# Patient Record
Sex: FEMALE | Race: WHITE | Employment: PART TIME | URBAN - METROPOLITAN AREA
[De-identification: names, ages, dates, MRNs, and addresses within clinical notes are randomized per-mention and may not be internally consistent; named-entity substitution may affect disease eponyms.]

---

## 2017-02-08 ENCOUNTER — HOSPITAL ENCOUNTER (OUTPATIENT)
Age: 37
Discharge: HOME | End: 2017-02-08
Attending: EMERGENCY MEDICINE
Payer: COMMERCIAL

## 2017-02-08 VITALS
HEIGHT: 64 IN | HEART RATE: 63 BPM | BODY MASS INDEX: 25.61 KG/M2 | SYSTOLIC BLOOD PRESSURE: 143 MMHG | DIASTOLIC BLOOD PRESSURE: 68 MMHG | OXYGEN SATURATION: 100 % | TEMPERATURE: 97 F | WEIGHT: 150 LBS | RESPIRATION RATE: 20 BRPM

## 2017-02-08 DIAGNOSIS — K12.1 STOMATITIS AND MUCOSITIS: Primary | ICD-10-CM

## 2017-02-08 DIAGNOSIS — K12.30 STOMATITIS AND MUCOSITIS: Primary | ICD-10-CM

## 2017-02-08 PROCEDURE — 99212 OFFICE O/P EST SF 10 MIN: CPT

## 2017-02-08 NOTE — ED PROVIDER NOTES
Patient Seen in: [address]    History   Patient presents with:  Sore Throat    Stated Complaint: left side ulcer in mouth, ear pain     HPI    25-year-old female with history of seizure disorder presents complaining of bladder ca; non-smoker   • Breast Cancer Maternal Aunt 46     others dx 60,63   • Breast Cancer Maternal Cousin Female 48     others dx 47, 64         Smoking Status: Never Smoker                      Smokeless Status: Never Used                        Com is in an unusual location and it is slightly larger than normal.  At this point, I am recommending supportive measures such as Motrin or Chloraseptic for supportive relief, but she needs to watch this through to complete resolution due to the unusual jonas

## 2017-02-11 ENCOUNTER — OFFICE VISIT (OUTPATIENT)
Dept: OTOLARYNGOLOGY | Facility: CLINIC | Age: 37
End: 2017-02-11

## 2017-02-11 VITALS
SYSTOLIC BLOOD PRESSURE: 120 MMHG | BODY MASS INDEX: 25.61 KG/M2 | WEIGHT: 150 LBS | TEMPERATURE: 98 F | HEIGHT: 64 IN | DIASTOLIC BLOOD PRESSURE: 78 MMHG

## 2017-02-11 DIAGNOSIS — K12.0 APHTHOUS ULCER: ICD-10-CM

## 2017-02-11 DIAGNOSIS — J32.9 CHRONIC SINUSITIS, UNSPECIFIED LOCATION: Primary | ICD-10-CM

## 2017-02-11 PROCEDURE — 99212 OFFICE O/P EST SF 10 MIN: CPT | Performed by: OTOLARYNGOLOGY

## 2017-02-11 PROCEDURE — 99214 OFFICE O/P EST MOD 30 MIN: CPT | Performed by: OTOLARYNGOLOGY

## 2017-02-11 NOTE — PROGRESS NOTES
Gigi Mukherjee is a 39year old female. Patient presents with:  Throat Problem: Soreness on her throat L side x 1 week. Sinus Problem: Congestion and sinus pressure.       HISTORY OF PRESENT ILLNESS  2/11/2017  She presents with concerns over a lesio Mother    • Breast Cancer Maternal Grandmother 46     d.54   • Stroke Maternal Grandfather    • Heart Attack Maternal Grandfather    • Cancer Maternal Grandfather 46     bladder ca; non-smoker; ; d.90   • Stroke Paternal Grandmother    • Breast Canc Normal. Parotid gland - Normal. Thyroid gland - Normal.   Eyes Normal Conjunctiva - Right: Normal, Left: Normal. Pupil - Right: Normal, Left: Normal. Fundus - Right: Normal, Left: Normal.   Neurological Normal Memory - Normal. Cranial nerves - Cranial nerv obstruction of the sinuses that could be causing all these issues. She will follow-up after this CAT scan to review the films  - CT SINUS (CPT=70486); Future    2.  Aphthous ulcer  I discussed the pathophysiology of benign aphthous ulcers in the fact that

## 2017-02-13 ENCOUNTER — TELEPHONE (OUTPATIENT)
Dept: OTOLARYNGOLOGY | Facility: CLINIC | Age: 37
End: 2017-02-13

## 2017-02-13 NOTE — TELEPHONE ENCOUNTER
Called and spoke with the pt regarding CT scan order, per [address], pt does not need prior authorization with reference number- [id number],pt verbalized understanding and agreed to call the scheduling department.

## 2017-02-14 ENCOUNTER — LAB ENCOUNTER (OUTPATIENT)
Dept: LAB | Age: 37
End: 2017-02-14
Attending: ALLERGY & IMMUNOLOGY
Payer: COMMERCIAL

## 2017-02-14 ENCOUNTER — OFFICE VISIT (OUTPATIENT)
Dept: ALLERGY | Facility: CLINIC | Age: 37
End: 2017-02-14

## 2017-02-14 VITALS
HEART RATE: 64 BPM | TEMPERATURE: 98 F | WEIGHT: 152 LBS | BODY MASS INDEX: 25.95 KG/M2 | DIASTOLIC BLOOD PRESSURE: 82 MMHG | HEIGHT: 64 IN | RESPIRATION RATE: 16 BRPM | SYSTOLIC BLOOD PRESSURE: 118 MMHG

## 2017-02-14 DIAGNOSIS — L29.9 PRURITUS: ICD-10-CM

## 2017-02-14 DIAGNOSIS — J45.20 RAD (REACTIVE AIRWAY DISEASE), MILD INTERMITTENT, UNCOMPLICATED: ICD-10-CM

## 2017-02-14 DIAGNOSIS — L50.5 CHOLINERGIC URTICARIA: ICD-10-CM

## 2017-02-14 DIAGNOSIS — J30.2 SEASONAL ALLERGIC RHINITIS, UNSPECIFIED ALLERGIC RHINITIS TRIGGER: ICD-10-CM

## 2017-02-14 DIAGNOSIS — J30.2 SEASONAL ALLERGIC RHINITIS, UNSPECIFIED ALLERGIC RHINITIS TRIGGER: Primary | ICD-10-CM

## 2017-02-14 LAB
ALBUMIN SERPL BCP-MCNC: 4.2 G/DL (ref 3.5–4.8)
ALBUMIN/GLOB SERPL: 1.5 {RATIO} (ref 1–2)
ALP SERPL-CCNC: 41 U/L (ref 32–100)
ALT SERPL-CCNC: 19 U/L (ref 14–54)
ANION GAP SERPL CALC-SCNC: 9 MMOL/L (ref 0–18)
AST SERPL-CCNC: 23 U/L (ref 15–41)
BASOPHILS # BLD: 0.1 K/UL (ref 0–0.2)
BASOPHILS NFR BLD: 1 %
BILIRUB SERPL-MCNC: 0.7 MG/DL (ref 0.3–1.2)
BUN SERPL-MCNC: 15 MG/DL (ref 8–20)
BUN/CREAT SERPL: 16.1 (ref 10–20)
CALCIUM SERPL-MCNC: 9.2 MG/DL (ref 8.5–10.5)
CHLORIDE SERPL-SCNC: 106 MMOL/L (ref 95–110)
CO2 SERPL-SCNC: 26 MMOL/L (ref 22–32)
CREAT SERPL-MCNC: 0.93 MG/DL (ref 0.5–1.5)
EOSINOPHIL # BLD: 0.1 K/UL (ref 0–0.7)
EOSINOPHIL NFR BLD: 2 %
ERYTHROCYTE [DISTWIDTH] IN BLOOD BY AUTOMATED COUNT: 12.6 % (ref 11–15)
ERYTHROCYTE [SEDIMENTATION RATE] IN BLOOD: 8 MM/HR (ref 0–20)
GLOBULIN PLAS-MCNC: 2.8 G/DL (ref 2.5–3.7)
GLUCOSE SERPL-MCNC: 110 MG/DL (ref 70–99)
HCT VFR BLD AUTO: 42.3 % (ref 35–48)
HGB BLD-MCNC: 14.5 G/DL (ref 12–16)
IGA SERPL-MCNC: 129 MG/DL (ref 68–378)
IGM SERPL-MCNC: 92 MG/DL (ref 60–263)
IMMUNOGLOBULIN PNL SER-MCNC: 953 MG/DL (ref 694–1618)
LYMPHOCYTES # BLD: 1.9 K/UL (ref 1–4)
LYMPHOCYTES NFR BLD: 32 %
MCH RBC QN AUTO: 29 PG (ref 27–32)
MCHC RBC AUTO-ENTMCNC: 34.3 G/DL (ref 32–37)
MCV RBC AUTO: 84.3 FL (ref 80–100)
MONOCYTES # BLD: 0.4 K/UL (ref 0–1)
MONOCYTES NFR BLD: 6 %
NEUTROPHILS # BLD AUTO: 3.6 K/UL (ref 1.8–7.7)
NEUTROPHILS NFR BLD: 59 %
OSMOLALITY UR CALC.SUM OF ELEC: 293 MOSM/KG (ref 275–295)
PLATELET # BLD AUTO: 190 K/UL (ref 140–400)
PMV BLD AUTO: 9.2 FL (ref 7.4–10.3)
POTASSIUM SERPL-SCNC: 4.7 MMOL/L (ref 3.3–5.1)
PROT SERPL-MCNC: 7 G/DL (ref 5.9–8.4)
RBC # BLD AUTO: 5.01 M/UL (ref 3.7–5.4)
SODIUM SERPL-SCNC: 141 MMOL/L (ref 136–144)
TSH SERPL-ACNC: 1.91 UIU/ML (ref 0.34–5.6)
WBC # BLD AUTO: 6.1 K/UL (ref 4–11)

## 2017-02-14 PROCEDURE — 99214 OFFICE O/P EST MOD 30 MIN: CPT | Performed by: ALLERGY & IMMUNOLOGY

## 2017-02-14 PROCEDURE — 82784 ASSAY IGA/IGD/IGG/IGM EACH: CPT

## 2017-02-14 PROCEDURE — 99213 OFFICE O/P EST LOW 20 MIN: CPT | Performed by: ALLERGY & IMMUNOLOGY

## 2017-02-14 PROCEDURE — 84443 ASSAY THYROID STIM HORMONE: CPT

## 2017-02-14 PROCEDURE — 85652 RBC SED RATE AUTOMATED: CPT

## 2017-02-14 PROCEDURE — 80053 COMPREHEN METABOLIC PANEL: CPT

## 2017-02-14 PROCEDURE — 85025 COMPLETE CBC W/AUTO DIFF WBC: CPT

## 2017-02-14 PROCEDURE — 36415 COLL VENOUS BLD VENIPUNCTURE: CPT

## 2017-02-14 PROCEDURE — 94010 BREATHING CAPACITY TEST: CPT | Performed by: ALLERGY & IMMUNOLOGY

## 2017-02-14 NOTE — PROGRESS NOTES
Froy Jacobs is a 39year old female. HPI:   Patient presents with:  Sinus Problem  Hives: f/u    Patient is a 27-year-old female who presents for follow-up with a chief complaint of allergies/sinus issues and hives.     Patient last seen by me on COLPOSCOPY,BX CERVIX/ENDOCERV CURR        Family History   Problem Relation Age of Onset   • Cancer Father 72     prostate ca, prostatectomy   • Hypertension Father    • Allergies Mother    • Breast Cancer Maternal Grandmother 46     d.54   • Stroke Matern Negative for cough, dyspnea and wheezing    PHYSICAL EXAM:   Constitutional: responsive, no acute distress noted  Head/Face: NC/Atraumatic  Eyes/Vision: conjunctiva and lids are normal extraocular motion is intact   Ears/Audiometry: tympanic membranes are results for CT of sinuses  Consider Singulair  Off Nasonex at this time  Xyzal as needed         Orders This Visit:    Orders Placed This Encounter  CBC W Differential  TSH W Reflex To Free T4  Comp Metabolic Panel (14)  Sed Rate, Westergren (Automated)  I

## 2017-02-16 ENCOUNTER — HOSPITAL ENCOUNTER (OUTPATIENT)
Dept: CT IMAGING | Facility: HOSPITAL | Age: 37
Discharge: HOME | End: 2017-02-16
Attending: OTOLARYNGOLOGY
Payer: COMMERCIAL

## 2017-02-16 DIAGNOSIS — J32.9 CHRONIC SINUSITIS, UNSPECIFIED LOCATION: ICD-10-CM

## 2017-02-16 PROCEDURE — 70486 CT MAXILLOFACIAL W/O DYE: CPT

## 2017-02-22 RX ORDER — LEVOCETIRIZINE DIHYDROCHLORIDE 5 MG/1
TABLET, FILM COATED ORAL
Qty: 30 TABLET | Refills: 0 | Status: SHIPPED | OUTPATIENT
Start: 2017-02-22 | End: 2017-03-02

## 2017-02-22 NOTE — TELEPHONE ENCOUNTER
AURORA, notified her of Dr. Sirena Gipson message as written below. Please call office to schedule 15-minute f/u appointment as advised.

## 2017-02-22 NOTE — TELEPHONE ENCOUNTER
Refill requested for:    Levocetirizine Dihydrochloride (XYZAL) 5 MG Oral Tab 90 tablet 1 8/11/2016       Sig :  Take 1 tablet (5 mg total) by mouth nightly.       Route:   Oral         Last office visit:  8/11/16.   Previously advised to follow-up in:  6 m

## 2017-02-22 NOTE — TELEPHONE ENCOUNTER
Call reviewed and noted. Xyzal refilled ×1 month.   Please schedule a follow-up appointment within the next month

## 2017-02-24 ENCOUNTER — TELEPHONE (OUTPATIENT)
Dept: OTOLARYNGOLOGY | Facility: CLINIC | Age: 37
End: 2017-02-24

## 2017-02-24 ENCOUNTER — TELEPHONE (OUTPATIENT)
Dept: ALLERGY | Facility: CLINIC | Age: 37
End: 2017-02-24

## 2017-02-24 NOTE — TELEPHONE ENCOUNTER
Pt wants Dr Елена Damon to be aware had sinus CT done -ordered by Dr Karan Tristan  Results should be available for Dr Елена Damon to view  Made 3/2 f/u with Dr Shey Alejandro allergy closed today & message will not be seen until Monday

## 2017-02-24 NOTE — TELEPHONE ENCOUNTER
Pt informed of results and recommendation; pt verbalized understanding and scheduled f/u appt w/ JMK.

## 2017-02-27 ENCOUNTER — OFFICE VISIT (OUTPATIENT)
Dept: OTOLARYNGOLOGY | Facility: CLINIC | Age: 37
End: 2017-02-27

## 2017-02-27 VITALS
BODY MASS INDEX: 25.61 KG/M2 | TEMPERATURE: 98 F | DIASTOLIC BLOOD PRESSURE: 70 MMHG | SYSTOLIC BLOOD PRESSURE: 90 MMHG | WEIGHT: 150 LBS | HEIGHT: 64 IN

## 2017-02-27 DIAGNOSIS — J32.9 CHRONIC SINUSITIS, UNSPECIFIED LOCATION: Primary | ICD-10-CM

## 2017-02-27 PROCEDURE — 99214 OFFICE O/P EST MOD 30 MIN: CPT | Performed by: OTOLARYNGOLOGY

## 2017-02-27 PROCEDURE — 99212 OFFICE O/P EST SF 10 MIN: CPT | Performed by: OTOLARYNGOLOGY

## 2017-02-27 NOTE — TELEPHONE ENCOUNTER
Dr. Colletta Leach,   LM informing pt that results received and will be reviewed at scheduled appt.  [email or web address]. Please contact office if any additional questions prior to appt. (per results Dr. Alden Trejo RN review results with pt of CT on 2/24/17)

## 2017-02-27 NOTE — PROGRESS NOTES
Zion Mitchell is a 39year old female. Patient presents with: Follow - Up: CT scan result- done on 2/16/17      HISTORY OF PRESENT ILLNESS  2/11/2017  She presents with concerns over a lesion on her palate on the left side.   This started last week Topics            Concern  Caffeine Concern        Yes    Comment:Coffee, soda         Family History   Problem Relation Age of Onset   • Cancer Father 72     prostate ca, prostatectomy   • Hypertension Father    • Allergies Mother    • Breast Cancer Mater Normal.   Constitutional Normal Overall appearance - Normal.   Psychiatric Normal Orientation - Oriented to time, place, person & situation. Appropriate mood and affect.    Neck Exam Normal Inspection - Normal. Palpation - Normal. Parotid gland - Normal. Th bilateral huge inferior turbinates. I discussed the medical management of this including nasal antihistamine sprays nasal steroid sprays antihistamine and decongestant pills and also elimination diets in attempt to see if any there are any food triggers.

## 2017-03-02 ENCOUNTER — OFFICE VISIT (OUTPATIENT)
Dept: ALLERGY | Facility: CLINIC | Age: 37
End: 2017-03-02

## 2017-03-02 VITALS
HEART RATE: 76 BPM | RESPIRATION RATE: 18 BRPM | TEMPERATURE: 98 F | SYSTOLIC BLOOD PRESSURE: 116 MMHG | DIASTOLIC BLOOD PRESSURE: 74 MMHG | WEIGHT: 150 LBS | BODY MASS INDEX: 26 KG/M2

## 2017-03-02 DIAGNOSIS — J30.2 SEASONAL ALLERGIC RHINITIS, UNSPECIFIED ALLERGIC RHINITIS TRIGGER: Primary | ICD-10-CM

## 2017-03-02 DIAGNOSIS — J45.20 RAD (REACTIVE AIRWAY DISEASE), MILD INTERMITTENT, UNCOMPLICATED: ICD-10-CM

## 2017-03-02 DIAGNOSIS — L50.5 CHOLINERGIC URTICARIA: ICD-10-CM

## 2017-03-02 PROCEDURE — 99212 OFFICE O/P EST SF 10 MIN: CPT | Performed by: ALLERGY & IMMUNOLOGY

## 2017-03-02 PROCEDURE — 99214 OFFICE O/P EST MOD 30 MIN: CPT | Performed by: ALLERGY & IMMUNOLOGY

## 2017-03-02 RX ORDER — MONTELUKAST SODIUM 10 MG/1
10 TABLET ORAL NIGHTLY
Qty: 30 TABLET | Refills: 0 | Status: SHIPPED | OUTPATIENT
Start: 2017-03-02 | End: 2017-04-04

## 2017-03-02 RX ORDER — LEVOCETIRIZINE DIHYDROCHLORIDE 5 MG/1
TABLET, FILM COATED ORAL
Qty: 30 TABLET | Refills: 10 | Status: SHIPPED | OUTPATIENT
Start: 2017-03-02 | End: 2019-02-06

## 2017-03-02 NOTE — PROGRESS NOTES
Radha Foreman is a 39year old female. HPI:   No chief complaint on file.     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 This    results in minimal narrowing of the left maxillary infundibulum.  A thin bony septations present in the left maxillary sinus.     ETHMOID SINUSES:    There is no significant muc Comment Vocal cord cyst removal    TONSILLECTOMY  1988    COLPOSCOPY,BX CERVIX/ENDOCERV CURR        Family History   Problem Relation Age of Onset   • Cancer Father 72     prostate ca, prostatectomy   • Hypertension Father    • Allergies Mother    • B Negative for pruritus and rash  Respiratory:  Negative for cough, dyspnea and wheezing    PHYSICAL EXAM:   Constitutional: responsive, no acute distress noted  Head/Face: NC/Atraumatic  Eyes/Vision: conjunctiva and lids are normal extraocular motion is int Referrals:  None     3/2/2017  Genesis Pedraza MD    If medication samples were provided today, they were provided solely for patient education and training related to self administration of these medications.   Teaching, instruction and sample was provid

## 2017-04-04 ENCOUNTER — TELEPHONE (OUTPATIENT)
Dept: ALLERGY | Facility: CLINIC | Age: 37
End: 2017-04-04

## 2017-04-04 RX ORDER — MONTELUKAST SODIUM 10 MG/1
10 TABLET ORAL NIGHTLY
Qty: 30 TABLET | Refills: 10 | Status: SHIPPED | OUTPATIENT
Start: 2017-04-04 | End: 2018-01-31

## 2017-04-04 NOTE — TELEPHONE ENCOUNTER
Lm informing rx approved as requested until f/u appt March 2018 and if any issues prior to f/u please contact office to schedule appt.

## 2017-04-04 NOTE — TELEPHONE ENCOUNTER
Pt calling for refill of singulair, tried requesting from her pharmacy but it wasn't allowed. Please advise      Current Outpatient Prescriptions:  Montelukast Sodium (SINGULAIR) 10 MG Oral Tab Take 1 tablet (10 mg total) by mouth nightly.  Disp: 30 tablet

## 2017-04-04 NOTE — TELEPHONE ENCOUNTER
Call reviewed and noted. Singular refilled ×30 pills with 10 refills.   Patient will need follow-up in March 2018 or sooner if needed

## 2017-04-04 NOTE — TELEPHONE ENCOUNTER
Dr. Brittney Wise,  Pt.  Requesting:  Montelukast Sodium (SINGULAIR) 10 MG Oral Tab 30 tablet 0 3/2/2017       Sig :  Take 1 tablet (10 mg total) by mouth nightly.       Route:   Oral         LOV: 3/2/17  F/U appt: 1 yr  Last filled; 3/2/17 x 1 month        Please

## 2017-04-06 ENCOUNTER — OFFICE VISIT (OUTPATIENT)
Dept: INTERNAL MEDICINE CLINIC | Facility: CLINIC | Age: 37
End: 2017-04-06

## 2017-04-06 VITALS
SYSTOLIC BLOOD PRESSURE: 108 MMHG | BODY MASS INDEX: 26.12 KG/M2 | HEART RATE: 69 BPM | WEIGHT: 153 LBS | DIASTOLIC BLOOD PRESSURE: 77 MMHG | RESPIRATION RATE: 16 BRPM | HEIGHT: 64 IN

## 2017-04-06 DIAGNOSIS — Z00.00 PHYSICAL EXAM, ANNUAL: Primary | ICD-10-CM

## 2017-04-06 PROCEDURE — 99395 PREV VISIT EST AGE 18-39: CPT | Performed by: INTERNAL MEDICINE

## 2017-04-06 NOTE — PROGRESS NOTES
HPI:   Vern Black is a 39year old female who presents for a complete physical exam. Symptoms: periods are regular.  Patient complains of allergies      Immunization History  Administered            Date(s) Administered    Fluvirin, 3 Years & >, Im 4          Past Surgical History    OTHER SURGICAL HISTORY      Comment Vocal cord cyst removal    TONSILLECTOMY  1988    COLPOSCOPY,BX CERVIX/ENDOCERV CURR        Family History   Problem Relation Age of Onset   • Cancer Father 72     prostate ca, prostat lesions  HEENT: atraumatic, normocephalic,ears and throat are clear  EYES:PERRLA, EOMI, normal optic disk,conjunctiva are clear  NECK: supple,no adenopathy,no bruits  CHEST: no chest tenderness\  LUNGS: clear to auscultation  CARDIO: RRR without murmur  GI

## 2017-04-08 ENCOUNTER — LAB ENCOUNTER (OUTPATIENT)
Dept: LAB | Facility: HOSPITAL | Age: 37
End: 2017-04-08
Attending: INTERNAL MEDICINE
Payer: COMMERCIAL

## 2017-04-08 DIAGNOSIS — Z00.00 PHYSICAL EXAM, ANNUAL: ICD-10-CM

## 2017-04-08 PROCEDURE — 80061 LIPID PANEL: CPT

## 2017-04-08 PROCEDURE — 36415 COLL VENOUS BLD VENIPUNCTURE: CPT

## 2017-04-08 PROCEDURE — 82306 VITAMIN D 25 HYDROXY: CPT

## 2017-04-08 PROCEDURE — 85025 COMPLETE CBC W/AUTO DIFF WBC: CPT

## 2017-04-08 PROCEDURE — 81001 URINALYSIS AUTO W/SCOPE: CPT

## 2017-06-14 ENCOUNTER — TELEPHONE (OUTPATIENT)
Dept: SURGERY | Facility: CLINIC | Age: 37
End: 2017-06-14

## 2017-06-15 DIAGNOSIS — R92.2 DENSE BREASTS: ICD-10-CM

## 2017-06-15 DIAGNOSIS — R92.8 ABNORMAL MAMMOGRAM: ICD-10-CM

## 2017-06-15 DIAGNOSIS — Z12.39 BREAST CANCER SCREENING, HIGH RISK PATIENT: Primary | ICD-10-CM

## 2017-06-29 ENCOUNTER — HOSPITAL ENCOUNTER (OUTPATIENT)
Dept: MAMMOGRAPHY | Facility: HOSPITAL | Age: 37
Discharge: HOME | End: 2017-06-29
Attending: SURGERY
Payer: COMMERCIAL

## 2017-06-29 DIAGNOSIS — R92.8 ABNORMAL MAMMOGRAM: ICD-10-CM

## 2017-06-29 DIAGNOSIS — Z12.39 SCREENING FOR BREAST CANCER: ICD-10-CM

## 2017-06-29 DIAGNOSIS — Z12.39 BREAST CANCER SCREENING, HIGH RISK PATIENT: ICD-10-CM

## 2017-06-29 DIAGNOSIS — Z12.39 SCREENING FOR BREAST CANCER: Primary | ICD-10-CM

## 2017-06-29 DIAGNOSIS — R92.2 DENSE BREASTS: ICD-10-CM

## 2017-06-29 PROCEDURE — 77063 BREAST TOMOSYNTHESIS BI: CPT | Performed by: CLINICAL NURSE SPECIALIST

## 2017-06-29 PROCEDURE — 77067 SCR MAMMO BI INCL CAD: CPT | Performed by: CLINICAL NURSE SPECIALIST

## 2017-12-04 RX ORDER — MOMETASONE FUROATE 50 UG/1
SPRAY, METERED NASAL
Qty: 1 BOTTLE | Refills: 2 | Status: SHIPPED | OUTPATIENT
Start: 2017-12-04 | End: 2018-02-08

## 2018-02-01 RX ORDER — MONTELUKAST SODIUM 10 MG/1
TABLET ORAL
Qty: 30 TABLET | Refills: 0 | Status: SHIPPED | OUTPATIENT
Start: 2018-02-01 | End: 2018-02-08

## 2018-02-01 NOTE — TELEPHONE ENCOUNTER
Refill requested for MONTELUKAST 10MG TABLETS  Will file in chart as: MONTELUKAST SODIUM 10 MG Oral Tab  TAKE 1 TABLET(10 MG) BY MOUTH NIGHTLY       Disp: 90 tablet Refills: 0    Class: Normal Start: 1/31/2018   Originally ordered: 11 months ago by Stacy Mcdonough MD  Last refill: 11/3/2017  To pharmacy: **Patient requests 90 days supply**    Last office visit: 3/2/2017     Previously advised to follow up in Follow-up in 1 year or sooner if needed    F/U currently scheduled? no    Date of last refill: 11/3/2017    ACTION: Routed to Dr. Linda Taylor for review.

## 2018-02-08 ENCOUNTER — OFFICE VISIT (OUTPATIENT)
Dept: ALLERGY | Facility: CLINIC | Age: 38
End: 2018-02-08

## 2018-02-08 VITALS
SYSTOLIC BLOOD PRESSURE: 128 MMHG | WEIGHT: 157 LBS | DIASTOLIC BLOOD PRESSURE: 82 MMHG | TEMPERATURE: 99 F | BODY MASS INDEX: 26.8 KG/M2 | HEIGHT: 64 IN | RESPIRATION RATE: 16 BRPM | HEART RATE: 90 BPM | OXYGEN SATURATION: 97 %

## 2018-02-08 DIAGNOSIS — J30.2 SEASONAL ALLERGIC RHINITIS, UNSPECIFIED TRIGGER: ICD-10-CM

## 2018-02-08 DIAGNOSIS — J45.20 RAD (REACTIVE AIRWAY DISEASE), MILD INTERMITTENT, UNCOMPLICATED: Primary | ICD-10-CM

## 2018-02-08 DIAGNOSIS — L50.5 CHOLINERGIC URTICARIA: ICD-10-CM

## 2018-02-08 DIAGNOSIS — L29.9 PRURITUS: ICD-10-CM

## 2018-02-08 PROCEDURE — 94010 BREATHING CAPACITY TEST: CPT | Performed by: ALLERGY & IMMUNOLOGY

## 2018-02-08 PROCEDURE — 99214 OFFICE O/P EST MOD 30 MIN: CPT | Performed by: ALLERGY & IMMUNOLOGY

## 2018-02-08 PROCEDURE — 99212 OFFICE O/P EST SF 10 MIN: CPT | Performed by: ALLERGY & IMMUNOLOGY

## 2018-02-08 RX ORDER — MONTELUKAST SODIUM 10 MG/1
TABLET ORAL
Qty: 90 TABLET | Refills: 2 | Status: SHIPPED | OUTPATIENT
Start: 2018-02-08 | End: 2018-09-01

## 2018-02-08 RX ORDER — MOMETASONE 50 UG/1
SPRAY, METERED NASAL
Qty: 1 BOTTLE | Refills: 5 | Status: SHIPPED | OUTPATIENT
Start: 2018-02-08 | End: 2019-02-06

## 2018-02-08 NOTE — PROGRESS NOTES
Geeta Freeman is a 44year old female. HPI:   Patient presents with: Allergies: Pt presents for 1 yr f/u, last seen for diagnosis of Seasonal allergic rhinitis, unspecified allergic rhinitis trigger, and , pruritus, reactive airway disease.     Pa extracted       Past Surgical History:  No date: COLPOSCOPY,BX CERVIX/ENDOCERV CURR  No date: OTHER SURGICAL HISTORY      Comment: Vocal cord cyst removal  1988: TONSILLECTOMY   Family History   Problem Relation Age of Onset   • Cancer Father 72     prosta sweats,weight loss, irritability and lethargy  ENMT:  Negative for ear drainage, hearing loss and nasal drainage  Eyes:  Negative for eye discharge and vision loss  Gastrointestinal:  Negative for abdominal pain, diarrhea and vomiting  Integumentary:  Rubi Dodge component  Stable and controlled with Xyzal once a night  May increase to twice daily if needed  Add Benadryl as needed    Follow-up in 1 year or sooner if needed         Orders This Visit:  No orders of the defined types were placed in this encounter.

## 2018-06-14 ENCOUNTER — HOSPITAL ENCOUNTER (OUTPATIENT)
Dept: MAMMOGRAPHY | Facility: HOSPITAL | Age: 38
Discharge: HOME | End: 2018-06-14
Attending: SURGERY
Payer: COMMERCIAL

## 2018-06-14 PROCEDURE — 77066 DX MAMMO INCL CAD BI: CPT | Performed by: SURGERY

## 2018-06-14 PROCEDURE — 77062 BREAST TOMOSYNTHESIS BI: CPT | Performed by: SURGERY

## 2018-09-04 RX ORDER — MONTELUKAST SODIUM 10 MG/1
TABLET ORAL
Qty: 90 TABLET | Refills: 0 | Status: SHIPPED | OUTPATIENT
Start: 2018-09-04 | End: 2018-12-02

## 2018-09-04 NOTE — TELEPHONE ENCOUNTER
Pt last seen in Allergy 2/8/2018 for . . .    Rad (reactive airway disease), mild intermittent, uncomplicated  (primary encounter diagnosis)  Seasonal allergic rhinitis, unspecified trigger  Pruritus  Cholinergic urticaria    Refill request received for .

## 2018-12-03 RX ORDER — MONTELUKAST SODIUM 10 MG/1
TABLET ORAL
Qty: 90 TABLET | Refills: 0 | Status: SHIPPED | OUTPATIENT
Start: 2018-12-03 | End: 2019-02-06

## 2018-12-03 NOTE — TELEPHONE ENCOUNTER
Pt last seen in Allergy 2/08/2018 for   .  . .    Rad (reactive airway disease), mild intermittent, uncomplicated  (primary encounter diagnosis)  Seasonal allergic rhinitis, unspecified trigger  Pruritus  Cholinergic urticaria    Refill request received for

## 2018-12-03 NOTE — TELEPHONE ENCOUNTER
Left message for patient informing her that she is due in Feb 2019 to follow up with Dr. Ching Mendoza.

## 2019-02-06 ENCOUNTER — OFFICE VISIT (OUTPATIENT)
Dept: ALLERGY | Facility: CLINIC | Age: 39
End: 2019-02-06
Payer: COMMERCIAL

## 2019-02-06 VITALS
OXYGEN SATURATION: 98 % | SYSTOLIC BLOOD PRESSURE: 96 MMHG | WEIGHT: 155 LBS | DIASTOLIC BLOOD PRESSURE: 60 MMHG | TEMPERATURE: 97 F | BODY MASS INDEX: 26.46 KG/M2 | HEART RATE: 77 BPM | HEIGHT: 64 IN

## 2019-02-06 DIAGNOSIS — J45.20 RAD (REACTIVE AIRWAY DISEASE), MILD INTERMITTENT, UNCOMPLICATED: ICD-10-CM

## 2019-02-06 DIAGNOSIS — L50.5 CHOLINERGIC URTICARIA: ICD-10-CM

## 2019-02-06 DIAGNOSIS — J30.2 SEASONAL ALLERGIC RHINITIS, UNSPECIFIED TRIGGER: Primary | ICD-10-CM

## 2019-02-06 PROCEDURE — 99212 OFFICE O/P EST SF 10 MIN: CPT | Performed by: ALLERGY & IMMUNOLOGY

## 2019-02-06 PROCEDURE — 99214 OFFICE O/P EST MOD 30 MIN: CPT | Performed by: ALLERGY & IMMUNOLOGY

## 2019-02-06 RX ORDER — LEVOCETIRIZINE DIHYDROCHLORIDE 5 MG/1
5 TABLET, FILM COATED ORAL NIGHTLY
Qty: 90 TABLET | Refills: 2 | Status: SHIPPED | OUTPATIENT
Start: 2019-02-06 | End: 2019-11-14

## 2019-02-06 RX ORDER — MOMETASONE 50 UG/1
2 SPRAY, METERED NASAL DAILY
Qty: 3 INHALER | Refills: 2 | Status: SHIPPED | OUTPATIENT
Start: 2019-02-06 | End: 2019-12-26

## 2019-02-06 RX ORDER — MONTELUKAST SODIUM 10 MG/1
10 TABLET ORAL NIGHTLY
Qty: 90 TABLET | Refills: 2 | Status: SHIPPED | OUTPATIENT
Start: 2019-02-06 | End: 2019-03-04

## 2019-02-06 NOTE — PROGRESS NOTES
Yuliet Spine is a 43year old female. HPI:   Patient presents with: Allergies: Patient present for allergic rhinitis    Patient is a 18-year-old female who presents for follow-up with a chief complaint of allergies.     Patient last seen by me in 71        prostate ca, prostatectomy   • Hypertension Father    • Allergies Mother    • Breast Cancer Maternal Grandmother 46        d.54   • Stroke Maternal Grandfather    • Heart Attack Maternal Grandfather    • Cancer Maternal Grandfather 46        blad Negative for eye discharge and vision loss  Gastrointestinal:  Negative for abdominal pain, diarrhea and vomiting  Integumentary:  Negative for pruritus and rash  Respiratory:  Negative for cough, dyspnea and wheezing    PHYSICAL EXAM:   Constitutional: re prescriptions requested or ordered in this encounter       Imaging & Referrals:  None     2/6/2019  Angelo Zuñiga MD    If medication samples were provided today, they were provided solely for patient education and training related to self administrati

## 2019-02-07 ENCOUNTER — TELEPHONE (OUTPATIENT)
Dept: ALLERGY | Facility: CLINIC | Age: 39
End: 2019-02-07

## 2019-02-07 NOTE — TELEPHONE ENCOUNTER
Prior auth needed for med below please contact plan [phone] to initiate pt id# [id number]    Current Outpatient Medications:   •  Mometasone Furoate (NASONEX) 50 MCG/ACT Nasal Suspension, 2 sprays by Nasal route daily. , Disp: 3 Inhaler, Rfl: 2

## 2019-02-07 NOTE — TELEPHONE ENCOUNTER
Please proceed with prior authorization.   In the interim patient may use Flonase/fluticasone or Nasacort over-the-counter

## 2019-02-07 NOTE — TELEPHONE ENCOUNTER
Called patient to notify her that PA will be started for below med. Asked her to return call so we can discuss alternative medication that she can take in the meantime.  Notified her we will be leaving the office shortly, and will not return until Monday mo

## 2019-02-08 NOTE — TELEPHONE ENCOUNTER
Attempted to call [employer] to speak with  in regards to starting a PA. Called [phone] and was on hold for 20 minutes waiting to speak with someone. Please try again later.

## 2019-02-11 NOTE — TELEPHONE ENCOUNTER
Pt returned call, informed that out office is in the process of completing  PA for Nasonex, pt informed once response is received from insurance will inform pt about PA approval and denial and any additional directions pt may have for pt regarding medicati

## 2019-02-12 RX ORDER — FLUTICASONE PROPIONATE 50 MCG
2 SPRAY, SUSPENSION (ML) NASAL DAILY
Qty: 1 BOTTLE | Refills: 5 | Status: CANCELLED | OUTPATIENT
Start: 2019-02-12

## 2019-02-12 NOTE — TELEPHONE ENCOUNTER
[employer] of [address] [place] [address] contacted at [phone], transferred to pharmacy line [phone]. [facility] [address] representative. Informed that Nasonex is not eligible for a PA, med not covered.   Flonase nasal spray 50 mcg/ACT is the only nasal spray covered und

## 2019-02-12 NOTE — TELEPHONE ENCOUNTER
Pt contacted, informed that Nasonex/mometasone is not covered by insurance, insurance was contacted and states that they will not cover Nasonex.   Per pt's Drug formulary and the insurance rep spoken with, pt informed that what is covered under her plan is

## 2019-02-12 NOTE — TELEPHONE ENCOUNTER
Please call patient and informed of the Nasonex/mometasone is not covered by her insurance.   We can either place a prescription for fluticasone/Flonase or she can buy over-the-counter Please forward to Dr Luz Pedroza

## 2019-03-04 RX ORDER — MONTELUKAST SODIUM 10 MG/1
TABLET ORAL
Qty: 90 TABLET | Refills: 1 | Status: SHIPPED | OUTPATIENT
Start: 2019-03-04 | End: 2019-11-15

## 2019-05-09 ENCOUNTER — TELEPHONE (OUTPATIENT)
Dept: ALLERGY | Facility: CLINIC | Age: 39
End: 2019-05-09

## 2019-05-09 RX ORDER — ALBUTEROL SULFATE 90 UG/1
2 AEROSOL, METERED RESPIRATORY (INHALATION) EVERY 6 HOURS PRN
Qty: 1 INHALER | Refills: 0 | Status: SHIPPED | OUTPATIENT
Start: 2019-05-09 | End: 2019-07-19

## 2019-05-09 NOTE — TELEPHONE ENCOUNTER
Pt states that she has a cough that started about a week ago. Per pt Dr. Aric Huntley told her to call the office when she gets a cough so that he can prescribe an inhaler to her.  Pt is aware the office is closed on Friday and this will be addressed when the [facility]

## 2019-05-09 NOTE — TELEPHONE ENCOUNTER
Pt contacted, informed that Rx for Albuterol was sent to her [facility] in [place] on [address]. Pt informed  advises that pt take 2 puffs of albuterol every 4-6 hours while RAD flaring.  Take Albuterol 2 puffs 15 min prior to exercise

## 2019-05-09 NOTE — TELEPHONE ENCOUNTER
Call reviewed and noted. Prescription for albuterol 2 puffs every 4-6 hours and 15 minutes prior to exercise sent to patient's pharmacy. If worsening symptoms after hours and recommend urgent care ED evaluation.

## 2019-05-09 NOTE — TELEPHONE ENCOUNTER
Left message for patient to call back regarding cough and asthma symptoms. In voicemail message, notified her that a nurse will be in the office until approximately 5:00PM tonight and will return for the phones from 8:00-12:00.  If she has any difficulty br

## 2019-05-09 NOTE — TELEPHONE ENCOUNTER
Assessment    Pt last seen in Allergy 2/6/2019  for AR, RAD, urticaria 2/6/2019. Pt reports that she developed an upper respiratory infection 5/1/2019 that has spread to lungs. Pt c/o nasal congestion that is improving.  Pt has a productive cough w

## 2019-05-28 ENCOUNTER — TELEPHONE (OUTPATIENT)
Dept: SURGERY | Facility: CLINIC | Age: 39
End: 2019-05-28

## 2019-05-28 NOTE — TELEPHONE ENCOUNTER
Spoke with patient to clarify the mammogram order.  I let her know I reviewed Dr Teresa Estevez last note to her, \"As long as you are having no new clinical symptoms you will be able to await any further screening for 1 year.  \"  I let her know there is an orde

## 2019-05-28 NOTE — TELEPHONE ENCOUNTER
[facility] for patient returning her call. She was asking about having Dr Joel Madsen order, and or review her results from her mammogram due in June. I let her know the correct mammogram is in the system, but Dr Joel Madsen will not get the results directly.  The order

## 2019-06-24 ENCOUNTER — HOSPITAL ENCOUNTER (OUTPATIENT)
Dept: MAMMOGRAPHY | Facility: HOSPITAL | Age: 39
Discharge: HOME | End: 2019-06-24
Attending: OBSTETRICS & GYNECOLOGY
Payer: COMMERCIAL

## 2019-06-24 DIAGNOSIS — Z12.31 ENCOUNTER FOR SCREENING MAMMOGRAM FOR MALIGNANT NEOPLASM OF BREAST: ICD-10-CM

## 2019-06-24 PROCEDURE — 77063 BREAST TOMOSYNTHESIS BI: CPT | Performed by: OBSTETRICS & GYNECOLOGY

## 2019-06-24 PROCEDURE — 77067 SCR MAMMO BI INCL CAD: CPT | Performed by: OBSTETRICS & GYNECOLOGY

## 2019-07-19 ENCOUNTER — OFFICE VISIT (OUTPATIENT)
Dept: INTERNAL MEDICINE CLINIC | Facility: CLINIC | Age: 39
End: 2019-07-19
Payer: COMMERCIAL

## 2019-07-19 VITALS
BODY MASS INDEX: 26.46 KG/M2 | HEIGHT: 64 IN | HEART RATE: 81 BPM | TEMPERATURE: 98 F | WEIGHT: 155 LBS | SYSTOLIC BLOOD PRESSURE: 125 MMHG | DIASTOLIC BLOOD PRESSURE: 86 MMHG

## 2019-07-19 DIAGNOSIS — R10.84 GENERALIZED ABDOMINAL DISCOMFORT: Primary | ICD-10-CM

## 2019-07-19 PROCEDURE — 99213 OFFICE O/P EST LOW 20 MIN: CPT | Performed by: PHYSICIAN ASSISTANT

## 2019-07-19 RX ORDER — OMEPRAZOLE 20 MG/1
20 CAPSULE, DELAYED RELEASE ORAL
Qty: 30 CAPSULE | Refills: 1 | Status: SHIPPED | OUTPATIENT
Start: 2019-07-19 | End: 2021-03-01

## 2019-07-19 RX ORDER — OMEPRAZOLE 20 MG/1
20 CAPSULE, DELAYED RELEASE ORAL
Qty: 30 CAPSULE | Refills: 1 | Status: SHIPPED | OUTPATIENT
Start: 2019-07-19 | End: 2019-07-19

## 2019-07-19 NOTE — PROGRESS NOTES
HPI:    Patient ID: Jackson Liao is a 35year old female. HPI  Per patient has been having upset stomach, symptoms vary from diarrhea, constipation, \"gassy\", and bloating x3 weeks has tried OTC meds with no some relief.   Has not had this in the suppressive treatment   • Vocal cord cyst    • [place] teeth extracted      Social History    Tobacco Use      Smoking status: Never Smoker      Smokeless tobacco: Never Used      Tobacco comment: No household smokers.      Alcohol use: Yes      Comment: soc Pulmonary/Chest: Effort normal and breath sounds normal. No respiratory distress. She has no wheezes. Abdominal: Soft. She exhibits no distension. There is no tenderness. There is no rebound and no guarding.    Lymphadenopathy:     She has no cervical a

## 2019-07-21 ENCOUNTER — HOSPITAL ENCOUNTER (OUTPATIENT)
Age: 39
Discharge: HOME | End: 2019-07-21
Attending: EMERGENCY MEDICINE
Payer: COMMERCIAL

## 2019-07-21 ENCOUNTER — APPOINTMENT (OUTPATIENT)
Dept: GENERAL RADIOLOGY | Age: 39
End: 2019-07-21
Attending: EMERGENCY MEDICINE
Payer: COMMERCIAL

## 2019-07-21 VITALS
RESPIRATION RATE: 18 BRPM | DIASTOLIC BLOOD PRESSURE: 73 MMHG | SYSTOLIC BLOOD PRESSURE: 107 MMHG | TEMPERATURE: 98 F | BODY MASS INDEX: 26.46 KG/M2 | HEIGHT: 64 IN | WEIGHT: 155 LBS | HEART RATE: 72 BPM | OXYGEN SATURATION: 100 %

## 2019-07-21 DIAGNOSIS — S92.424A CLOSED NONDISPLACED FRACTURE OF DISTAL PHALANX OF RIGHT GREAT TOE, INITIAL ENCOUNTER: Primary | ICD-10-CM

## 2019-07-21 PROCEDURE — 28510 TREATMENT OF TOE FRACTURE: CPT

## 2019-07-21 PROCEDURE — 99212 OFFICE O/P EST SF 10 MIN: CPT

## 2019-07-21 PROCEDURE — 99213 OFFICE O/P EST LOW 20 MIN: CPT

## 2019-07-21 PROCEDURE — 73660 X-RAY EXAM OF TOE(S): CPT | Performed by: EMERGENCY MEDICINE

## 2019-07-21 NOTE — ED PROVIDER NOTES
Patient presents with:  Lower Extremity Injury (musculoskeletal)      HPI:     Radha Foreman is a 35year old female who presents today with a chief complaint of pain in the right great toe after crush injury by a piano bench falling directly on top 7/21/2019  CONCLUSION:  1. Transversely oriented fracture of the right distal phalangeal tuft of the hallux. 2. Mild hallux valgus deformity.     Dictated by (CST): Dillon Hernandez MD on 7/21/2019 at 12:30     Approved by (CST): Dillon Hernandez MD on 7/21/

## 2019-11-15 RX ORDER — MONTELUKAST SODIUM 10 MG/1
TABLET ORAL
Qty: 90 TABLET | Refills: 0 | Status: SHIPPED | OUTPATIENT
Start: 2019-11-15 | End: 2019-12-26

## 2019-11-15 RX ORDER — LEVOCETIRIZINE DIHYDROCHLORIDE 5 MG/1
TABLET, FILM COATED ORAL
Qty: 90 TABLET | Refills: 0 | Status: SHIPPED | OUTPATIENT
Start: 2019-11-15 | End: 2019-12-26

## 2019-11-15 NOTE — TELEPHONE ENCOUNTER
Refill requested for:  Name from pharmacy: LEVOCETIRIZINE 5MG TABLETS          Will file in chart as: LEVOCETIRIZINE DIHYDROCHLORIDE 5 MG Oral Tab         Sig: TAKE 1 TABLET(5 MG) BY MOUTH EVERY NIGHT    Disp:  90 tablet    Refills:  0    Start: 11/14/2019

## 2019-11-15 NOTE — TELEPHONE ENCOUNTER
Refill requested for:  Name from pharmacy: MONTELUKAST 10MG TABLETS          Will file in chart as: MONTELUKAST SODIUM 10 MG Oral Tab    The source prescription was discontinued on 3/4/2019 by Froy Kelly MD.         Sig: TAKE 1 TABLET(10 MG) BY MATILDA

## 2019-11-15 NOTE — TELEPHONE ENCOUNTER
Xyzal refilled x90 days. Patient will need yearly follow-up by February 2020.   Please call to schedule

## 2019-11-15 NOTE — TELEPHONE ENCOUNTER
Singular refilled x90 days. Patient will be due for one-year follow-up in February 2020.   Please call to schedule

## 2019-11-15 NOTE — TELEPHONE ENCOUNTER
Called patient to notify her of RX refill and assisted in scheduling yearly follow up for Februrary 2020.

## 2019-12-13 ENCOUNTER — OFFICE VISIT (OUTPATIENT)
Dept: INTERNAL MEDICINE CLINIC | Facility: CLINIC | Age: 39
End: 2019-12-13
Payer: COMMERCIAL

## 2019-12-13 ENCOUNTER — TELEPHONE (OUTPATIENT)
Dept: ALLERGY | Facility: CLINIC | Age: 39
End: 2019-12-13

## 2019-12-13 VITALS
BODY MASS INDEX: 27.25 KG/M2 | HEART RATE: 98 BPM | SYSTOLIC BLOOD PRESSURE: 116 MMHG | TEMPERATURE: 98 F | DIASTOLIC BLOOD PRESSURE: 87 MMHG | WEIGHT: 159.63 LBS | HEIGHT: 64 IN | OXYGEN SATURATION: 98 %

## 2019-12-13 DIAGNOSIS — R05.9 COUGH: Primary | ICD-10-CM

## 2019-12-13 PROCEDURE — 99213 OFFICE O/P EST LOW 20 MIN: CPT | Performed by: PHYSICIAN ASSISTANT

## 2019-12-13 NOTE — TELEPHONE ENCOUNTER
Call reviewed and noted. Agree with triage advice provided. Agree with patient being seen at urgent care or with PCP today  if having difficulty breathing.   Patient has follow-up appointment with me on December 26

## 2019-12-13 NOTE — PROGRESS NOTES
HPI:    Patient ID: Scott Littlejohn is a 35year old female. HPI   Patient presents with a cough for the past three weeks.  She notes before thanksgiving it started as a chest cold with mucus prodcution that subsided and now she has a intermittent co Medical History:   Diagnosis Date   • Dysplasia of cervix    • Mononucleosis    • SEASONAL ALLERGIES    • Seizure disorder ([facility] [address]     infantile treated to age 2   • Thyroid nodule     teenager, suppressive treatment   • Vocal cord cyst    • [place] teeth ext Conjunctivae and EOM are normal.   Neck: Normal range of motion. Neck supple. No thyromegaly present. Cardiovascular: Normal rate, regular rhythm and normal heart sounds. Pulmonary/Chest: Effort normal. No respiratory distress. She has wheezes.    Dorean Race

## 2019-12-13 NOTE — TELEPHONE ENCOUNTER
RN called patient with Dr. Navas Bring recommendations. She states she just saw her PCP and they didn't find any infection. She reports they have started her on a new inhaler. Per Epic, it appears that they have ordered Fluticasone-Salmeterol.    See encount

## 2019-12-13 NOTE — TELEPHONE ENCOUNTER
Wheezing Cough Triage:    Patient had suspected sinus infection with post nasal drip for a few weeks. Has settled into chest with cough with wheezing. Has albuterol inhaler and using periodically. Last used it two nights ago with some relief.      Leeann Monday but will route encounter to him and call if he has additional recommendations. Patient verbalized understanding and has no further questions. Routed to Dr. Елена Damon.

## 2019-12-26 ENCOUNTER — OFFICE VISIT (OUTPATIENT)
Dept: ALLERGY | Facility: CLINIC | Age: 39
End: 2019-12-26
Payer: COMMERCIAL

## 2019-12-26 VITALS
OXYGEN SATURATION: 98 % | HEART RATE: 90 BPM | RESPIRATION RATE: 18 BRPM | SYSTOLIC BLOOD PRESSURE: 116 MMHG | DIASTOLIC BLOOD PRESSURE: 77 MMHG | TEMPERATURE: 98 F

## 2019-12-26 DIAGNOSIS — J30.2 SEASONAL ALLERGIC RHINITIS, UNSPECIFIED TRIGGER: Primary | ICD-10-CM

## 2019-12-26 DIAGNOSIS — L50.5 CHOLINERGIC URTICARIA: ICD-10-CM

## 2019-12-26 DIAGNOSIS — J45.20 RAD (REACTIVE AIRWAY DISEASE), MILD INTERMITTENT, UNCOMPLICATED: ICD-10-CM

## 2019-12-26 PROCEDURE — 94010 BREATHING CAPACITY TEST: CPT | Performed by: ALLERGY & IMMUNOLOGY

## 2019-12-26 PROCEDURE — 99214 OFFICE O/P EST MOD 30 MIN: CPT | Performed by: ALLERGY & IMMUNOLOGY

## 2019-12-26 RX ORDER — MOMETASONE 50 UG/1
2 SPRAY, METERED NASAL DAILY
Qty: 3 INHALER | Refills: 2 | Status: SHIPPED | OUTPATIENT
Start: 2019-12-26

## 2019-12-26 RX ORDER — MONTELUKAST SODIUM 10 MG/1
TABLET ORAL
Qty: 90 TABLET | Refills: 1 | Status: SHIPPED | OUTPATIENT
Start: 2019-12-26 | End: 2020-08-15

## 2019-12-26 RX ORDER — LEVOCETIRIZINE DIHYDROCHLORIDE 5 MG/1
TABLET, FILM COATED ORAL
Qty: 90 TABLET | Refills: 1 | Status: SHIPPED | OUTPATIENT
Start: 2019-12-26 | End: 2020-08-15

## 2019-12-26 NOTE — PATIENT INSTRUCTIONS
1. AR  Continue with current Xyzal Singulair Nasonex  Consider sinus rinses if refractory      2. Asthma/rad  Recent flare over the past month which appear to be triggered by a viral infection. Patient 85% improved at this time.   Patient has Advair at [facility]

## 2019-12-26 NOTE — PROGRESS NOTES
Den Banerjee is a 35year old female. HPI:   No chief complaint on file.     Patient is a 71-year-old female who presents for follow-up    Patient last seen by me in February 2019    Patient has a history of rhinitis that can worsen the spring and Cancer Father 72        prostate ca, prostatectomy   • Hypertension Father    • Allergies Mother    • Breast Cancer Maternal Grandmother 46        d.54   • Stroke Maternal Grandfather    • Heart Attack Maternal Grandfather    • Cancer Maternal Grandfather Negative for irregular heartbeat/palpitations, chest pain, edema  Constitutional:  Negative night sweats,weight loss, irritability and lethargy  ENMT:  Negative for ear drainage, hearing loss and nasal drainage  Eyes:  Negative for eye discharge and visio symptoms more than 2 days/week  Flu vaccine up-to-date      3. Cholinergic urticaria  Continue with Xyzal 5 mg once night at bedtime.   May titrate up to 2-4 times per day if needed  Avoid heat if possible      Follow-up in 6 to 12 months or sooner if need

## 2020-07-02 ENCOUNTER — PATIENT MESSAGE (OUTPATIENT)
Dept: INTERNAL MEDICINE CLINIC | Facility: CLINIC | Age: 40
End: 2020-07-02

## 2020-07-02 ENCOUNTER — NURSE TRIAGE (OUTPATIENT)
Dept: INTERNAL MEDICINE CLINIC | Facility: CLINIC | Age: 40
End: 2020-07-02

## 2020-07-02 NOTE — TELEPHONE ENCOUNTER
Please call patient and triage regarding MyChart message copied here. [employer] message response sent in original [employer] encounter, per department process. ----- Message from Mary Anderson sent at 7/2/2020  9:19 AM CDT -----  Regarding: Non-Urgent M

## 2020-07-02 NOTE — TELEPHONE ENCOUNTER
From: Onelia Brody  To: Elis Costa PA-C  Sent: 7/2/2020 9:19 AM CDT  Subject: Non-Urgent Medical Question    Hello,    For a few weeks now, I have had tiny goosebump-like bumps on my forearms (and only there- nowhere else) that are very itchy.  The

## 2020-07-02 NOTE — TELEPHONE ENCOUNTER
Action Requested: Summary for Provider     []  Critical Lab, Recommendations Needed  [] Need Additional Advice  []   FYI    []   Need Orders  [] Need Medications Sent to Pharmacy  []  Other     SUMMARY: Pt was called and she stated that for the past 2 week

## 2020-07-03 ENCOUNTER — TELEMEDICINE (OUTPATIENT)
Dept: INTERNAL MEDICINE CLINIC | Facility: CLINIC | Age: 40
End: 2020-07-03
Payer: COMMERCIAL

## 2020-07-03 DIAGNOSIS — T78.40XA RASH DUE TO ALLERGY: Primary | ICD-10-CM

## 2020-07-03 DIAGNOSIS — R21 RASH DUE TO ALLERGY: Primary | ICD-10-CM

## 2020-07-03 PROCEDURE — 99213 OFFICE O/P EST LOW 20 MIN: CPT | Performed by: PHYSICIAN ASSISTANT

## 2020-07-03 RX ORDER — METHYLPREDNISOLONE 4 MG/1
TABLET ORAL
Qty: 1 KIT | Refills: 0 | Status: SHIPPED | OUTPATIENT
Start: 2020-07-03 | End: 2021-03-01

## 2020-07-03 NOTE — PROGRESS NOTES
This is a telemedicine visit with live, interactive video and audio. Patient understands and accepts financial responsibility for any deductible, co-insurance and/or co-pays associated with this service.     SUBJECTIVE  Patient presents with itchy rash methylPREDNISolone (MEDROL) 4 MG Oral Tablet Therapy Pack As directed. 1 kit 0   • Mometasone Furoate (NASONEX) 50 MCG/ACT Nasal Suspension 2 sprays by Nasal route daily.  3 Inhaler 2   • Levocetirizine Dihydrochloride 5 MG Oral Tab TAKE 1 TABLET(5 MG) BY M

## 2020-08-15 RX ORDER — MONTELUKAST SODIUM 10 MG/1
TABLET ORAL
Qty: 30 TABLET | Refills: 0 | Status: SHIPPED | OUTPATIENT
Start: 2020-08-15 | End: 2021-03-01

## 2020-08-15 RX ORDER — LEVOCETIRIZINE DIHYDROCHLORIDE 5 MG/1
TABLET, FILM COATED ORAL
Qty: 30 TABLET | Refills: 0 | Status: SHIPPED | OUTPATIENT
Start: 2020-08-15 | End: 2021-03-01

## 2020-08-15 NOTE — TELEPHONE ENCOUNTER
Refill requested for:  Name from pharmacy: MONTELUKAST 10MG TABLETS          Will file in chart as: MONTELUKAST SODIUM 10 MG Oral Tab         Sig: TAKE 1 TABLET BY MOUTH EVERY NIGHT    Disp:  90 tablet    Refills:  1 (Pharmacy requested: Not specified)

## 2020-08-15 NOTE — TELEPHONE ENCOUNTER
Refill requested for    Name from pharmacy: LEVOCETIRIZINE 5MG TABLETS         Will file in chart as: LEVOCETIRIZINE DIHYDROCHLORIDE 5 MG Oral Tab         Sig: TAKE 1 TABLET BY MOUTH EVERY NIGHT    Disp:  90 tablet    Refills:  1 (Pharmacy requested: Not s

## 2020-08-15 NOTE — TELEPHONE ENCOUNTER
RN left voicemail to notify patient of RX refill and need for follow up. Provided call back number to schedule.

## 2020-08-19 ENCOUNTER — HOSPITAL ENCOUNTER (OUTPATIENT)
Dept: MAMMOGRAPHY | Facility: HOSPITAL | Age: 40
Discharge: HOME | End: 2020-08-19
Attending: OBSTETRICS & GYNECOLOGY
Payer: COMMERCIAL

## 2020-08-19 DIAGNOSIS — Z12.31 ENCOUNTER FOR SCREENING MAMMOGRAM FOR MALIGNANT NEOPLASM OF BREAST: ICD-10-CM

## 2020-08-19 PROCEDURE — 77063 BREAST TOMOSYNTHESIS BI: CPT | Performed by: OBSTETRICS & GYNECOLOGY

## 2020-08-19 PROCEDURE — 77067 SCR MAMMO BI INCL CAD: CPT | Performed by: OBSTETRICS & GYNECOLOGY

## 2020-08-24 ENCOUNTER — TELEMEDICINE (OUTPATIENT)
Dept: INTERNAL MEDICINE CLINIC | Facility: CLINIC | Age: 40
End: 2020-08-24

## 2020-08-24 DIAGNOSIS — L30.9 DERMATITIS: Primary | ICD-10-CM

## 2020-08-24 PROCEDURE — 99213 OFFICE O/P EST LOW 20 MIN: CPT | Performed by: PHYSICIAN ASSISTANT

## 2020-08-24 RX ORDER — CLOTRIMAZOLE AND BETAMETHASONE DIPROPIONATE 10; .64 MG/G; MG/G
CREAM TOPICAL
Qty: 45 G | Refills: 0 | Status: SHIPPED | OUTPATIENT
Start: 2020-08-24 | End: 2021-03-01

## 2020-08-24 NOTE — PROGRESS NOTES
This is a telemedicine visit with live, interactive video and audio. Patient understands and accepts financial responsibility for any deductible, co-insurance and/or co-pays associated with this service.     SUBJECTIVE  Pt presents with follow up of dari Medications   Medication Sig Dispense Refill   • triamcinolone acetonide 0.1 % External Cream Apply topically 2 (two) times daily as needed.  60 g 0   • clotrimazole-betamethasone 1-0.05 % External Cream Apply a small amount to the effected area daily for o Apply a small amount to the effected area daily for one week.  Do not apply inside the vagina          Andreina Rabago PA-C

## 2020-09-09 RX ORDER — LEVOCETIRIZINE DIHYDROCHLORIDE 5 MG/1
TABLET, FILM COATED ORAL
Qty: 30 TABLET | Refills: 0 | OUTPATIENT
Start: 2020-09-09

## 2020-09-09 RX ORDER — MONTELUKAST SODIUM 10 MG/1
TABLET ORAL
Qty: 30 TABLET | Refills: 0 | OUTPATIENT
Start: 2020-09-09

## 2020-09-09 NOTE — TELEPHONE ENCOUNTER
Refill requested for   Montelukast Sodium 10 MG Oral Tab 30 tablet 0 8/15/2020    Sig: Lee Ann Distance 1 TABLET BY MOUTH EVERY NIGHT         Last office visit: 12/26/19    Previously advised to follow up in:  6-12 months    F/U currently scheduled?  No      Appointm

## 2020-09-09 NOTE — TELEPHONE ENCOUNTER
Spoke with patient, assisted to schedule video visit for 9/23/20 at 9:30am.  She reports asthma has been well-controlled. Some recent skin \"issues\" she has seen Dermatology for. She would like to discuss if any medication changes are warranted.

## 2020-09-09 NOTE — TELEPHONE ENCOUNTER
Refill request tinnitus patient is overdue for follow-up appointment. Refill was granted in mid August advised to follow-up within the month.   Patient will need follow-up appointment for further refills

## 2020-09-09 NOTE — TELEPHONE ENCOUNTER
Given her history of asthma and being on Singulair I would prefer follow-up by 6 months. Patient last seen in December 2019.

## 2020-09-23 ENCOUNTER — TELEMEDICINE (OUTPATIENT)
Dept: ALLERGY | Facility: CLINIC | Age: 40
End: 2020-09-23

## 2020-09-23 DIAGNOSIS — J30.2 SEASONAL ALLERGIC RHINITIS, UNSPECIFIED TRIGGER: Primary | ICD-10-CM

## 2020-09-23 DIAGNOSIS — J45.20 RAD (REACTIVE AIRWAY DISEASE), MILD INTERMITTENT, UNCOMPLICATED: ICD-10-CM

## 2020-09-23 DIAGNOSIS — L50.5 CHOLINERGIC URTICARIA: ICD-10-CM

## 2020-09-23 PROCEDURE — 99214 OFFICE O/P EST MOD 30 MIN: CPT | Performed by: ALLERGY & IMMUNOLOGY

## 2020-09-23 RX ORDER — LEVOCETIRIZINE DIHYDROCHLORIDE 5 MG/1
5 TABLET, FILM COATED ORAL NIGHTLY
Qty: 90 TABLET | Refills: 2 | Status: CANCELLED | OUTPATIENT
Start: 2020-09-23

## 2020-09-23 RX ORDER — LEVOCETIRIZINE DIHYDROCHLORIDE 5 MG/1
5 TABLET, FILM COATED ORAL NIGHTLY
Qty: 90 TABLET | Refills: 2 | Status: SHIPPED | OUTPATIENT
Start: 2020-09-23 | End: 2021-06-17

## 2020-09-23 RX ORDER — LEVOCETIRIZINE DIHYDROCHLORIDE 5 MG/1
5 TABLET, FILM COATED ORAL NIGHTLY
Qty: 90 TABLET | Refills: 1 | Status: CANCELLED | OUTPATIENT
Start: 2020-09-23

## 2020-09-23 RX ORDER — MONTELUKAST SODIUM 10 MG/1
10 TABLET ORAL NIGHTLY
Qty: 90 TABLET | Refills: 2 | Status: CANCELLED | OUTPATIENT
Start: 2020-09-23

## 2020-09-23 RX ORDER — MONTELUKAST SODIUM 10 MG/1
10 TABLET ORAL NIGHTLY
Qty: 90 TABLET | Refills: 1 | Status: CANCELLED | OUTPATIENT
Start: 2020-09-23

## 2020-09-23 RX ORDER — MONTELUKAST SODIUM 10 MG/1
10 TABLET ORAL NIGHTLY
Qty: 90 TABLET | Refills: 2 | Status: SHIPPED | OUTPATIENT
Start: 2020-09-23 | End: 2021-06-17

## 2020-09-23 NOTE — PROGRESS NOTES
Andreina Milligan is a 38year old female. HPI:   No chief complaint on file. This visit is conducted using Telemedicine with live, interactive video and audio during this Coronavirus pandemic.      Please note that the following visit was completed u teenager, suppressive treatment   • Vocal cord cyst    • [place] teeth extracted       Past Surgical History:   Procedure Laterality Date   • CARPAL TUNNEL RELEASE     • COLPOSCOPY,BX CERVIX/ENDOCERV CURR     • OTHER SURGICAL HISTORY      Vocal cord cyst re Aerosol Inhale 2 puffs into the lungs 2 (two) times daily.  (Patient not taking: Reported on 12/26/2019 ) 1 Inhaler 0   • omeprazole 20 MG Oral Capsule Delayed Release Take 1 capsule (20 mg total) by mouth every morning before breakfast. (Patient not taking component  Stable with Xyzal once a night at bedtime. May increase up to 2-4 times per day if increasing symptoms    3. RAD  Denies active or persistent symptoms at this time. Albuterol 2 puffs every 4-6 hours as needed.   Flu vaccine up-to-date        F/

## 2021-06-17 RX ORDER — MONTELUKAST SODIUM 10 MG/1
10 TABLET ORAL NIGHTLY
Qty: 90 TABLET | Refills: 0 | Status: SHIPPED | OUTPATIENT
Start: 2021-06-17 | End: 2021-09-15

## 2021-06-17 RX ORDER — LEVOCETIRIZINE DIHYDROCHLORIDE 5 MG/1
5 TABLET, FILM COATED ORAL NIGHTLY
Qty: 90 TABLET | Refills: 0 | Status: SHIPPED | OUTPATIENT
Start: 2021-06-17 | End: 2021-09-15

## 2021-06-17 NOTE — TELEPHONE ENCOUNTER
Refill requested for   Name from pharmacy: MONTELUKAST 10MG TABLETS          Will file in chart as: MONTELUKAST SODIUM 10 MG Oral Tab    Sig: TAKE 1 TABLET(10 MG) BY MOUTH EVERY NIGHT    Disp:  90 tablet    Refills:  2 (Pharmacy requested: Not specified)

## 2021-06-17 NOTE — TELEPHONE ENCOUNTER
Sent a mychart regarding refill request (Xyzal and Singulair) and the need to schedule an office visit in September 2021.

## 2021-09-15 ENCOUNTER — OFFICE VISIT (OUTPATIENT)
Dept: ALLERGY | Facility: CLINIC | Age: 41
End: 2021-09-15
Payer: COMMERCIAL

## 2021-09-15 VITALS
HEART RATE: 76 BPM | RESPIRATION RATE: 20 BRPM | SYSTOLIC BLOOD PRESSURE: 131 MMHG | HEIGHT: 64 IN | OXYGEN SATURATION: 97 % | DIASTOLIC BLOOD PRESSURE: 85 MMHG | WEIGHT: 171 LBS | BODY MASS INDEX: 29.19 KG/M2

## 2021-09-15 DIAGNOSIS — Z88.0 H/O PENICILLIN-TYPE ANTIBIOTIC ALLERGY: ICD-10-CM

## 2021-09-15 DIAGNOSIS — J45.20 RAD (REACTIVE AIRWAY DISEASE), MILD INTERMITTENT, UNCOMPLICATED: Primary | ICD-10-CM

## 2021-09-15 DIAGNOSIS — J30.2 SEASONAL ALLERGIC RHINITIS, UNSPECIFIED TRIGGER: ICD-10-CM

## 2021-09-15 DIAGNOSIS — L50.5 CHOLINERGIC URTICARIA: ICD-10-CM

## 2021-09-15 DIAGNOSIS — Z92.29 COVID-19 VACCINE SERIES COMPLETED: ICD-10-CM

## 2021-09-15 DIAGNOSIS — L25.3 CONTACT DERMATITIS DUE TO OTHER CHEMICAL PRODUCT, UNSPECIFIED CONTACT DERMATITIS TYPE: ICD-10-CM

## 2021-09-15 PROCEDURE — 3008F BODY MASS INDEX DOCD: CPT | Performed by: ALLERGY & IMMUNOLOGY

## 2021-09-15 PROCEDURE — 3075F SYST BP GE 130 - 139MM HG: CPT | Performed by: ALLERGY & IMMUNOLOGY

## 2021-09-15 PROCEDURE — 3079F DIAST BP 80-89 MM HG: CPT | Performed by: ALLERGY & IMMUNOLOGY

## 2021-09-15 PROCEDURE — 99214 OFFICE O/P EST MOD 30 MIN: CPT | Performed by: ALLERGY & IMMUNOLOGY

## 2021-09-15 RX ORDER — PENICILLIN V POTASSIUM 500 MG/1
500 TABLET ORAL ONCE
Qty: 1 TABLET | Refills: 0 | Status: SHIPPED | OUTPATIENT
Start: 2021-09-15 | End: 2021-09-15

## 2021-09-15 RX ORDER — LEVOCETIRIZINE DIHYDROCHLORIDE 5 MG/1
5 TABLET, FILM COATED ORAL NIGHTLY
Qty: 90 TABLET | Refills: 2 | Status: SHIPPED | OUTPATIENT
Start: 2021-09-15

## 2021-09-15 RX ORDER — MONTELUKAST SODIUM 10 MG/1
10 TABLET ORAL NIGHTLY
Qty: 90 TABLET | Refills: 2 | Status: SHIPPED | OUTPATIENT
Start: 2021-09-15

## 2021-09-15 NOTE — PATIENT INSTRUCTIONS
1. Asthma  Continue with Singulair 10 mg once a day. Reviewed signs and symptoms of persistent asthma including the rules of 2.   Albuterol 2 puffs every 4-6 hours as needed if having active coughing wheezing shortness of breath  Recommend a flu shot this

## 2021-09-15 NOTE — PROGRESS NOTES
Gigi Mukherjee is a 42year old female. HPI:   Patient presents with: Allergies: Pt presents for yearly f/u visit. Allergic reaction to mouth guard, smile direct club used. Red lips, swollen, and bumpy. Denies any SOB or trouble swallowing.   F/u teeth extracted       Past Surgical History:   Procedure Laterality Date   • CARPAL TUNNEL RELEASE     • COLPOSCOPY,BX CERVIX/ENDOCERV CURR     • OTHER SURGICAL HISTORY      Vocal cord cyst removal   • TONSILLECTOMY  1988      Family History   Problem Rela mouthguard      ROS:   Allergic/Immuno:  See hpi  Cardiovascular:  Negative for irregular heartbeat/palpitations, chest pain, edema  Constitutional:  Negative night sweats,weight loss, irritability and lethargy  ENMT:  Negative for ear drainage, asthma    2. Ar  Continue with Xyzal Singulair Flonase  Reviewed avoidance measures    3. Cholinergic urticaria  Continue with Xyzal once a day up to 4 times per day if needed    4.   History of penicillin allergy  Recommend skin testing to penicillin at f

## 2021-09-25 ENCOUNTER — HOSPITAL ENCOUNTER (OUTPATIENT)
Dept: MAMMOGRAPHY | Facility: HOSPITAL | Age: 41
Discharge: HOME | End: 2021-09-25
Attending: INTERNAL MEDICINE
Payer: COMMERCIAL

## 2021-09-25 DIAGNOSIS — Z12.31 ENCOUNTER FOR SCREENING MAMMOGRAM FOR MALIGNANT NEOPLASM OF BREAST: ICD-10-CM

## 2021-09-25 DIAGNOSIS — Z12.31 BREAST CANCER SCREENING BY MAMMOGRAM: ICD-10-CM

## 2021-09-25 PROCEDURE — 77067 SCR MAMMO BI INCL CAD: CPT | Performed by: OBSTETRICS & GYNECOLOGY

## 2021-09-25 PROCEDURE — 77063 BREAST TOMOSYNTHESIS BI: CPT | Performed by: OBSTETRICS & GYNECOLOGY

## 2021-11-03 ENCOUNTER — APPOINTMENT (OUTPATIENT)
Dept: URBAN - METROPOLITAN AREA CLINIC 321 | Age: 41
Setting detail: DERMATOLOGY
End: 2021-11-03

## 2021-11-03 DIAGNOSIS — L91.8 OTHER HYPERTROPHIC DISORDERS OF THE SKIN: ICD-10-CM

## 2021-11-03 DIAGNOSIS — L81.4 OTHER MELANIN HYPERPIGMENTATION: ICD-10-CM

## 2021-11-03 DIAGNOSIS — D22 MELANOCYTIC NEVI: ICD-10-CM

## 2021-11-03 PROBLEM — D22.5 MELANOCYTIC NEVI OF TRUNK: Status: ACTIVE | Noted: 2021-11-03

## 2021-11-03 PROCEDURE — OTHER COUNSELING: OTHER

## 2021-11-03 PROCEDURE — 99213 OFFICE O/P EST LOW 20 MIN: CPT

## 2021-11-03 PROCEDURE — OTHER SKIN TAG REMOVAL (COSMETIC): OTHER

## 2021-11-03 ASSESSMENT — LOCATION DETAILED DESCRIPTION DERM
LOCATION DETAILED: RIGHT LATERAL TRAPEZIAL NECK
LOCATION DETAILED: LEFT LATERAL ABDOMEN
LOCATION DETAILED: LEFT MEDIAL UPPER BACK
LOCATION DETAILED: RIGHT MEDIAL TRAPEZIAL NECK
LOCATION DETAILED: LEFT SUPERIOR FLANK
LOCATION DETAILED: RIGHT SUPERIOR MEDIAL UPPER BACK

## 2021-11-03 ASSESSMENT — LOCATION ZONE DERM
LOCATION ZONE: NECK
LOCATION ZONE: TRUNK

## 2021-11-03 ASSESSMENT — LOCATION SIMPLE DESCRIPTION DERM
LOCATION SIMPLE: RIGHT UPPER BACK
LOCATION SIMPLE: POSTERIOR NECK
LOCATION SIMPLE: LEFT UPPER BACK
LOCATION SIMPLE: ABDOMEN

## 2021-11-03 NOTE — PROCEDURE: SKIN TAG REMOVAL (COSMETIC)
Anesthesia Type: 1% lidocaine with epinephrine
Removed With: gradle excision
Detail Level: Detailed
Anesthesia Volume In Cc: 3
Consent: Written consent obtained and the risks of skin tag removal was reviewed with the patient including but not limited to bleeding, pigmentary change, infection, pain, and remote possibility of scarring.
Price (Use Numbers Only, No Special Characters Or $): 75

## 2021-11-05 PROBLEM — Z12.31 BREAST CANCER SCREENING BY MAMMOGRAM: Status: ACTIVE | Noted: 2021-11-05

## 2021-11-05 PROBLEM — Z71.85 VACCINE COUNSELING: Status: ACTIVE | Noted: 2021-11-05

## 2021-11-05 PROBLEM — Z12.4 PAP SMEAR FOR CERVICAL CANCER SCREENING: Status: ACTIVE | Noted: 2021-11-05

## 2021-11-08 ENCOUNTER — MED REC SCAN ONLY (OUTPATIENT)
Dept: INTERNAL MEDICINE CLINIC | Facility: CLINIC | Age: 41
End: 2021-11-08

## 2021-11-08 ENCOUNTER — OFFICE VISIT (OUTPATIENT)
Dept: INTERNAL MEDICINE CLINIC | Facility: CLINIC | Age: 41
End: 2021-11-08
Payer: COMMERCIAL

## 2021-11-08 VITALS
RESPIRATION RATE: 16 BRPM | HEART RATE: 98 BPM | WEIGHT: 171.19 LBS | BODY MASS INDEX: 29.23 KG/M2 | HEIGHT: 64 IN | TEMPERATURE: 98 F | DIASTOLIC BLOOD PRESSURE: 85 MMHG | SYSTOLIC BLOOD PRESSURE: 124 MMHG | OXYGEN SATURATION: 98 %

## 2021-11-08 DIAGNOSIS — Z80.3 FAMILY HISTORY OF MALIGNANT NEOPLASM OF BREAST: ICD-10-CM

## 2021-11-08 DIAGNOSIS — Z12.4 PAP SMEAR FOR CERVICAL CANCER SCREENING: ICD-10-CM

## 2021-11-08 DIAGNOSIS — N92.6 MENSTRUAL CHANGES: ICD-10-CM

## 2021-11-08 DIAGNOSIS — L65.9 HAIR LOSS: ICD-10-CM

## 2021-11-08 DIAGNOSIS — H92.09 EAR ACHE: ICD-10-CM

## 2021-11-08 DIAGNOSIS — R20.0 NUMBNESS AND TINGLING OF FOOT: ICD-10-CM

## 2021-11-08 DIAGNOSIS — Z71.85 VACCINE COUNSELING: ICD-10-CM

## 2021-11-08 DIAGNOSIS — Z00.00 PHYSICAL EXAM, ANNUAL: Primary | ICD-10-CM

## 2021-11-08 DIAGNOSIS — R20.2 NUMBNESS AND TINGLING OF FOOT: ICD-10-CM

## 2021-11-08 DIAGNOSIS — J45.20 MILD INTERMITTENT ASTHMA WITHOUT COMPLICATION: ICD-10-CM

## 2021-11-08 DIAGNOSIS — R31.29 OTHER MICROSCOPIC HEMATURIA: ICD-10-CM

## 2021-11-08 DIAGNOSIS — R19.7 DIARRHEA, UNSPECIFIED TYPE: ICD-10-CM

## 2021-11-08 DIAGNOSIS — Z12.31 BREAST CANCER SCREENING BY MAMMOGRAM: ICD-10-CM

## 2021-11-08 PROCEDURE — 3079F DIAST BP 80-89 MM HG: CPT | Performed by: INTERNAL MEDICINE

## 2021-11-08 PROCEDURE — 3008F BODY MASS INDEX DOCD: CPT | Performed by: INTERNAL MEDICINE

## 2021-11-08 PROCEDURE — 3074F SYST BP LT 130 MM HG: CPT | Performed by: INTERNAL MEDICINE

## 2021-11-08 PROCEDURE — 81003 URINALYSIS AUTO W/O SCOPE: CPT | Performed by: INTERNAL MEDICINE

## 2021-11-08 PROCEDURE — 90732 PPSV23 VACC 2 YRS+ SUBQ/IM: CPT | Performed by: INTERNAL MEDICINE

## 2021-11-08 PROCEDURE — 99386 PREV VISIT NEW AGE 40-64: CPT | Performed by: INTERNAL MEDICINE

## 2021-11-08 PROCEDURE — 99204 OFFICE O/P NEW MOD 45 MIN: CPT | Performed by: INTERNAL MEDICINE

## 2021-11-08 PROCEDURE — 90471 IMMUNIZATION ADMIN: CPT | Performed by: INTERNAL MEDICINE

## 2021-11-08 NOTE — PATIENT INSTRUCTIONS
ASSESSMENT/PLAN:   Physical exam, annual  (primary encounter diagnosis) Check blood and urine.      Family history of malignant neoplasm of breast    Vaccine counseling    Breast cancer screening by mammogram Normal mammogram. Continue self breast exam ever

## 2021-11-08 NOTE — PROGRESS NOTES
HPI:    Patient ID: Froy Jacobs is a 42year old female.     Froy Jacobs is a 42year old female who presents for a complete physical exam.   HPI:   Patient presents with:  New Patient  Physical: Annual exam     Patient's last menstrual jim 35.5 04/08/2017         Mammogram due on 09/25/2022    Current Outpatient Medications   Medication Sig Dispense Refill   • montelukast 10 MG Oral Tab Take 1 tablet (10 mg total) by mouth nightly.  90 tablet 2   • levocetirizine 5 MG Oral Tab Take 1 tablet ( Used      Tobacco comment: No household smokers.      Vaping Use      Vaping Use: Never used    Substance and Sexual Activity      Alcohol use: Not Currently        Comment: socially      Drug use: No    Other Topics      Concerns:        Caffeine Concern: myalgias, nausea, neck pain, numbness, rash, sore throat, swollen glands, urinary symptoms, vertigo, visual change, vomiting or weakness. Nothing (Denies any family history. Denies any change in products. Has not called here for 2 months due to Covid.   A Negative for activity change, appetite change, chills, diaphoresis, fatigue, fever and unexpected weight change.    HENT: Negative for congestion, dental problem, drooling, ear discharge, ear pain, facial swelling, hearing loss, mouth sores, nosebleeds, pos Not on a daily basis. All other systems reviewed and are negative. Current Outpatient Medications   Medication Sig Dispense Refill   • montelukast 10 MG Oral Tab Take 1 tablet (10 mg total) by mouth nightly.  90 tablet 2   • levocetirizine 5 MG Ora Maternal Uncle 59        bladder ca; non-smoker      Social History:   Social History    Tobacco Use      Smoking status: Never Smoker      Smokeless tobacco: Never Used      Tobacco comment: No household smokers.      Vaping Use      Vaping Use: Never used has normal mobility. Nose:      Right Sinus: No maxillary sinus tenderness or frontal sinus tenderness. Left Sinus: No maxillary sinus tenderness or frontal sinus tenderness. Mouth/Throat:      Comments: Patient wearing mask.    Did not have stridor, decreased air movement or transmitted upper airway sounds. No decreased breath sounds, wheezing, rhonchi or rales. Chest:      Chest wall: No tenderness. Breasts:      Right: No supraclavicular adenopathy.       Left: No supraclavicular adenopa self breast exam every month. Pap smear for cervical cancer screening Check records of pap thru gyne. Other microscopic hematuria Check urine. Numbness and tingling of foot Maybe local. Comfy shoes.      Mild intermittent asthma without complica

## 2021-11-11 ENCOUNTER — LAB ENCOUNTER (OUTPATIENT)
Dept: LAB | Age: 41
End: 2021-11-11
Attending: INTERNAL MEDICINE
Payer: COMMERCIAL

## 2021-11-11 ENCOUNTER — TELEPHONE (OUTPATIENT)
Dept: INTERNAL MEDICINE CLINIC | Facility: CLINIC | Age: 41
End: 2021-11-11

## 2021-11-11 DIAGNOSIS — R19.7 DIARRHEA, UNSPECIFIED TYPE: ICD-10-CM

## 2021-11-11 DIAGNOSIS — Z00.00 PHYSICAL EXAM, ANNUAL: ICD-10-CM

## 2021-11-11 DIAGNOSIS — R73.9 HYPERGLYCEMIA: Primary | ICD-10-CM

## 2021-11-11 DIAGNOSIS — N92.6 MENSTRUAL CHANGES: ICD-10-CM

## 2021-11-11 DIAGNOSIS — L65.9 HAIR LOSS: ICD-10-CM

## 2021-11-11 PROBLEM — H92.09 EAR ACHE: Status: RESOLVED | Noted: 2021-11-08 | Resolved: 2021-11-11

## 2021-11-11 PROBLEM — E78.2 MIXED HYPERLIPIDEMIA: Status: ACTIVE | Noted: 2021-11-11

## 2021-11-11 PROBLEM — E55.9 VITAMIN D DEFICIENCY: Status: ACTIVE | Noted: 2021-11-11

## 2021-11-11 PROCEDURE — 82784 ASSAY IGA/IGD/IGG/IGM EACH: CPT

## 2021-11-11 PROCEDURE — 84443 ASSAY THYROID STIM HORMONE: CPT

## 2021-11-11 PROCEDURE — 84403 ASSAY OF TOTAL TESTOSTERONE: CPT | Performed by: INTERNAL MEDICINE

## 2021-11-11 PROCEDURE — 85025 COMPLETE CBC W/AUTO DIFF WBC: CPT

## 2021-11-11 PROCEDURE — 86003 ALLG SPEC IGE CRUDE XTRC EA: CPT

## 2021-11-11 PROCEDURE — 83036 HEMOGLOBIN GLYCOSYLATED A1C: CPT | Performed by: INTERNAL MEDICINE

## 2021-11-11 PROCEDURE — 83001 ASSAY OF GONADOTROPIN (FSH): CPT

## 2021-11-11 PROCEDURE — 80061 LIPID PANEL: CPT

## 2021-11-11 PROCEDURE — 83002 ASSAY OF GONADOTROPIN (LH): CPT

## 2021-11-11 PROCEDURE — 84402 ASSAY OF FREE TESTOSTERONE: CPT | Performed by: INTERNAL MEDICINE

## 2021-11-11 PROCEDURE — 82785 ASSAY OF IGE: CPT

## 2021-11-11 PROCEDURE — 82607 VITAMIN B-12: CPT

## 2021-11-11 PROCEDURE — 82306 VITAMIN D 25 HYDROXY: CPT

## 2021-11-11 PROCEDURE — 80053 COMPREHEN METABOLIC PANEL: CPT

## 2021-11-11 PROCEDURE — 86038 ANTINUCLEAR ANTIBODIES: CPT

## 2021-11-11 PROCEDURE — 82671 ASSAY OF ESTROGENS: CPT

## 2021-11-11 PROCEDURE — 36415 COLL VENOUS BLD VENIPUNCTURE: CPT

## 2021-11-11 PROCEDURE — 83516 IMMUNOASSAY NONANTIBODY: CPT

## 2021-11-11 NOTE — TELEPHONE ENCOUNTER
Jessie/ Phlebotomist of [facility] is calling to confirm patient's lab order and when to complete lab. Patient is currently waiting at the lab.

## 2021-11-11 NOTE — TELEPHONE ENCOUNTER
Each lab order from 11/8/2021 has a different expected date. Lab needed to know if can draw all the labs today? Spoke to Dr Kellen Neal, Dr Yadira Boswell not in yet.  Will have  draw all the tubes for all the labs and can pitch if doctor states someth

## 2021-11-11 NOTE — TELEPHONE ENCOUNTER
Called Megan Green from [place] lab to let her know I spoke to Dr Rosemary Mccain about the blood orders and she states she can draw all of them from 11/8/2021

## 2021-11-18 ENCOUNTER — PATIENT MESSAGE (OUTPATIENT)
Dept: INTERNAL MEDICINE CLINIC | Facility: CLINIC | Age: 41
End: 2021-11-18

## 2021-11-18 NOTE — TELEPHONE ENCOUNTER
From: Corry Bethea  To: Brenda Pete. Yadira Boswell MD  Sent: 11/18/2021 1:13 PM CST  Subject: Vitamin D    Dr. Yadira Boswell,     I appreciate all the info in your messages, and am working on implementing the lifestyle changes you recommended.     One question about

## 2022-01-04 ENCOUNTER — HOSPITAL ENCOUNTER (OUTPATIENT)
Dept: ULTRASOUND IMAGING | Facility: HOSPITAL | Age: 42
Discharge: HOME | End: 2022-01-04
Attending: INTERNAL MEDICINE
Payer: COMMERCIAL

## 2022-01-04 DIAGNOSIS — N93.8 DUB (DYSFUNCTIONAL UTERINE BLEEDING): ICD-10-CM

## 2022-01-04 PROCEDURE — 76856 US EXAM PELVIC COMPLETE: CPT | Performed by: INTERNAL MEDICINE

## 2022-01-04 PROCEDURE — 76830 TRANSVAGINAL US NON-OB: CPT | Performed by: INTERNAL MEDICINE

## 2022-02-09 ENCOUNTER — APPOINTMENT (OUTPATIENT)
Dept: URBAN - METROPOLITAN AREA CLINIC 321 | Age: 42
Setting detail: DERMATOLOGY
End: 2022-02-09

## 2022-02-09 DIAGNOSIS — L259 CONTACT DERMATITIS AND OTHER ECZEMA, UNSPECIFIED CAUSE: ICD-10-CM

## 2022-02-09 PROBLEM — L30.9 DERMATITIS, UNSPECIFIED: Status: ACTIVE | Noted: 2022-02-09

## 2022-02-09 PROCEDURE — 99214 OFFICE O/P EST MOD 30 MIN: CPT

## 2022-02-09 PROCEDURE — OTHER COUNSELING: OTHER

## 2022-02-09 PROCEDURE — OTHER ADDITIONAL NOTES: OTHER

## 2022-02-09 PROCEDURE — OTHER PRESCRIPTION MEDICATION MANAGEMENT: OTHER

## 2022-02-09 PROCEDURE — OTHER PRESCRIPTION: OTHER

## 2022-02-09 RX ORDER — HYDROCORTISONE 25 MG/G
OINTMENT TOPICAL
Qty: 28.35 | Refills: 0 | Status: ERX | COMMUNITY
Start: 2022-02-09

## 2022-02-09 ASSESSMENT — LOCATION ZONE DERM: LOCATION ZONE: LIP

## 2022-02-09 ASSESSMENT — LOCATION SIMPLE DESCRIPTION DERM: LOCATION SIMPLE: LEFT LIP

## 2022-02-09 ASSESSMENT — LOCATION DETAILED DESCRIPTION DERM
LOCATION DETAILED: LEFT SUPERIOR VERMILION LIP
LOCATION DETAILED: LEFT INFERIOR VERMILION LIP

## 2022-02-09 NOTE — PROCEDURE: PRESCRIPTION MEDICATION MANAGEMENT
Plan: Recommend applying Vaseline when not using the hydrocortisone oint
Detail Level: Zone
Render In Strict Bullet Format?: No

## 2022-02-09 NOTE — PROCEDURE: ADDITIONAL NOTES
Detail Level: Simple
Render Risk Assessment In Note?: no
Additional Notes: Pt has redness on her gums possible lichen planus? f it does not improve will consider biopsy.

## 2022-03-09 ENCOUNTER — APPOINTMENT (OUTPATIENT)
Dept: URBAN - METROPOLITAN AREA CLINIC 321 | Age: 42
Setting detail: DERMATOLOGY
End: 2022-03-09

## 2022-03-09 DIAGNOSIS — L259 CONTACT DERMATITIS AND OTHER ECZEMA, UNSPECIFIED CAUSE: ICD-10-CM

## 2022-03-09 PROBLEM — L30.9 DERMATITIS, UNSPECIFIED: Status: ACTIVE | Noted: 2022-03-09

## 2022-03-09 PROCEDURE — OTHER PRESCRIPTION: OTHER

## 2022-03-09 PROCEDURE — OTHER COUNSELING: OTHER

## 2022-03-09 PROCEDURE — OTHER PRESCRIPTION MEDICATION MANAGEMENT: OTHER

## 2022-03-09 PROCEDURE — 99214 OFFICE O/P EST MOD 30 MIN: CPT

## 2022-03-09 RX ORDER — HYDROCORTISONE 25 MG/G
OINTMENT TOPICAL
Qty: 28.35 | Refills: 0 | Status: ACTIVE

## 2022-03-09 ASSESSMENT — LOCATION SIMPLE DESCRIPTION DERM: LOCATION SIMPLE: LEFT LIP

## 2022-03-09 ASSESSMENT — LOCATION ZONE DERM: LOCATION ZONE: LIP

## 2022-03-09 ASSESSMENT — LOCATION DETAILED DESCRIPTION DERM
LOCATION DETAILED: LEFT SUPERIOR VERMILION LIP
LOCATION DETAILED: LEFT INFERIOR VERMILION LIP

## 2022-03-09 NOTE — HPI: RASH
What Type Of Note Output Would You Prefer (Optional)?: Bullet Format
Is The Patient Presenting As Previously Scheduled?: Yes
How Severe Is Your Rash?: mild
Is This A New Presentation, Or A Follow-Up?: Follow Up Rash
Additional History: Noticed improvement after not using Aquafor .

## 2022-03-09 NOTE — PROCEDURE: PRESCRIPTION MEDICATION MANAGEMENT
Detail Level: Zone
Render In Strict Bullet Format?: No
Plan: Recommend applying Vaseline when not using the hydrocortisone oint

## 2022-05-11 ENCOUNTER — APPOINTMENT (OUTPATIENT)
Dept: URBAN - METROPOLITAN AREA CLINIC 244 | Age: 42
Setting detail: DERMATOLOGY
End: 2022-05-11

## 2022-05-11 DIAGNOSIS — L259 CONTACT DERMATITIS AND OTHER ECZEMA, UNSPECIFIED CAUSE: ICD-10-CM

## 2022-05-11 PROBLEM — L23.9 ALLERGIC CONTACT DERMATITIS, UNSPECIFIED CAUSE: Status: ACTIVE | Noted: 2022-05-11

## 2022-05-11 PROCEDURE — OTHER COUNSELING: OTHER

## 2022-05-11 PROCEDURE — 99213 OFFICE O/P EST LOW 20 MIN: CPT

## 2022-05-11 PROCEDURE — OTHER PRESCRIPTION MEDICATION MANAGEMENT: OTHER

## 2022-05-11 ASSESSMENT — LOCATION DETAILED DESCRIPTION DERM
LOCATION DETAILED: LEFT INFERIOR VERMILION LIP
LOCATION DETAILED: LEFT SUPERIOR VERMILION LIP

## 2022-05-11 ASSESSMENT — LOCATION SIMPLE DESCRIPTION DERM: LOCATION SIMPLE: LEFT LIP

## 2022-05-11 ASSESSMENT — LOCATION ZONE DERM: LOCATION ZONE: LIP

## 2022-05-11 NOTE — PROCEDURE: PRESCRIPTION MEDICATION MANAGEMENT
Render In Strict Bullet Format?: No
Plan: Recommend applying Vaseline when not using the hydrocortisone oint
Detail Level: Zone

## 2022-05-16 ENCOUNTER — TELEPHONE (OUTPATIENT)
Dept: ALLERGY | Facility: CLINIC | Age: 42
End: 2022-05-16

## 2022-05-16 NOTE — TELEPHONE ENCOUNTER
Discussed below message with patient. She is agreeable to plan of care. She understands not everything may be available in Serum IgE testing. Pt to schedule a follow up visit with Dr. Juan Galicia via [facility].

## 2022-05-16 NOTE — TELEPHONE ENCOUNTER
Call reviewed and noted. BRIAN BETHEA may not have allergy testing to seaweed or seasonings if not available through serum IgE testing. She may consider trial of Xyzal, levocetirizine 5 mg once a day up to twice a day as needed as an antihistamine if still having significant allergy symptoms. May use cool compresses to the eyelids twice a day as well.

## 2022-05-16 NOTE — TELEPHONE ENCOUNTER
Patient states she's been having new allergy symptoms. The skin around her eyes is red/irritated and has sinus pressure. She is requesting her medication to be adjusted. No other symptoms. Please advise.

## 2022-05-23 RX ORDER — LEVOCETIRIZINE DIHYDROCHLORIDE 5 MG/1
TABLET, FILM COATED ORAL
Qty: 90 TABLET | Refills: 0 | Status: SHIPPED | OUTPATIENT
Start: 2022-05-23

## 2022-06-01 RX ORDER — MONTELUKAST SODIUM 10 MG/1
TABLET ORAL
Qty: 90 TABLET | Refills: 2 | Status: SHIPPED | OUTPATIENT
Start: 2022-06-01

## 2022-06-28 ENCOUNTER — TELEMEDICINE (OUTPATIENT)
Dept: INTERNAL MEDICINE CLINIC | Facility: CLINIC | Age: 42
End: 2022-06-28

## 2022-06-28 DIAGNOSIS — R21 RASH: Primary | ICD-10-CM

## 2022-06-28 PROCEDURE — 99213 OFFICE O/P EST LOW 20 MIN: CPT | Performed by: INTERNAL MEDICINE

## 2022-06-28 RX ORDER — TRIAMCINOLONE ACETONIDE 1 MG/G
CREAM TOPICAL 2 TIMES DAILY
Qty: 60 G | Refills: 0 | Status: SHIPPED | OUTPATIENT
Start: 2022-06-28

## 2022-07-19 ENCOUNTER — OFFICE VISIT (OUTPATIENT)
Dept: ALLERGY | Facility: CLINIC | Age: 42
End: 2022-07-19
Payer: COMMERCIAL

## 2022-07-19 VITALS
TEMPERATURE: 98 F | OXYGEN SATURATION: 99 % | WEIGHT: 169 LBS | BODY MASS INDEX: 28.85 KG/M2 | HEIGHT: 64 IN | HEART RATE: 95 BPM | DIASTOLIC BLOOD PRESSURE: 79 MMHG | SYSTOLIC BLOOD PRESSURE: 135 MMHG

## 2022-07-19 DIAGNOSIS — J45.20 RAD (REACTIVE AIRWAY DISEASE), MILD INTERMITTENT, UNCOMPLICATED: Primary | ICD-10-CM

## 2022-07-19 DIAGNOSIS — J30.2 SEASONAL ALLERGIC RHINITIS, UNSPECIFIED TRIGGER: ICD-10-CM

## 2022-07-19 DIAGNOSIS — R05.3 POST-COVID CHRONIC COUGH: ICD-10-CM

## 2022-07-19 DIAGNOSIS — U09.9 POST-COVID CHRONIC COUGH: ICD-10-CM

## 2022-07-19 DIAGNOSIS — Z88.0 H/O PENICILLIN-TYPE ANTIBIOTIC ALLERGY: ICD-10-CM

## 2022-07-19 DIAGNOSIS — Z92.29 COVID-19 VACCINE SERIES COMPLETED: ICD-10-CM

## 2022-07-19 DIAGNOSIS — L50.5 CHOLINERGIC URTICARIA: ICD-10-CM

## 2022-07-19 PROCEDURE — 3078F DIAST BP <80 MM HG: CPT | Performed by: ALLERGY & IMMUNOLOGY

## 2022-07-19 PROCEDURE — 99214 OFFICE O/P EST MOD 30 MIN: CPT | Performed by: ALLERGY & IMMUNOLOGY

## 2022-07-19 PROCEDURE — 3075F SYST BP GE 130 - 139MM HG: CPT | Performed by: ALLERGY & IMMUNOLOGY

## 2022-07-19 PROCEDURE — 3008F BODY MASS INDEX DOCD: CPT | Performed by: ALLERGY & IMMUNOLOGY

## 2022-07-19 RX ORDER — ALBUTEROL SULFATE 90 UG/1
2 AEROSOL, METERED RESPIRATORY (INHALATION) EVERY 6 HOURS PRN
Qty: 1 EACH | Refills: 0 | Status: SHIPPED | OUTPATIENT
Start: 2022-07-19

## 2022-07-19 RX ORDER — PREDNISONE 20 MG/1
TABLET ORAL
Qty: 10 TABLET | Refills: 0 | Status: SHIPPED | OUTPATIENT
Start: 2022-07-19

## 2022-07-19 RX ORDER — CHLORHEXIDINE GLUCONATE 0.12 MG/ML
RINSE ORAL
COMMUNITY
Start: 2022-04-04

## 2022-07-19 RX ORDER — LEVOCETIRIZINE DIHYDROCHLORIDE 5 MG/1
5 TABLET, FILM COATED ORAL NIGHTLY
Qty: 90 TABLET | Refills: 1 | Status: SHIPPED | OUTPATIENT
Start: 2022-07-19

## 2022-07-19 NOTE — PATIENT INSTRUCTIONS
#1 asthma  Persistent dry cough over the past month since having COVID last month. No current inhalers including albuterol are ICS. Currently on Singulair. Room for improvement. No ED visits or prednisone in the interim    Recs: Start prednisone 40 mg once a day with food x5 days. Albuterol 2 puffs every 4-6 hours as needed if having active cough wheezing or shortness of breath  If cough improves with prednisone to return then patient may call for prescription for Advair or other ICS/LABA  Reviewed signs and symptoms of persistent asthma including rules of 2    #2 post COVID cough. See above #1. Start prednisone 40 mg a day. Albuterol every 4-6 hours as needed. #3 allergic rhinitis  Continue with Xyzal Singulair Flonase/Nasonex  Reviewed avoidance measures and potential treatment option of immunotherapy    #4 chronic urticaria  Continue with Xyzal once a day up to 4 times per day if needed.   Consider Xolair if refractory to Xyzal up to 4 times per day    #5 COVID vaccines up-to-date x3 doses      Follow-up in 6 months or sooner if needed

## 2022-11-03 ENCOUNTER — APPOINTMENT (OUTPATIENT)
Dept: URBAN - METROPOLITAN AREA CLINIC 244 | Age: 42
Setting detail: DERMATOLOGY
End: 2022-11-07

## 2022-11-03 DIAGNOSIS — R21 RASH AND OTHER NONSPECIFIC SKIN ERUPTION: ICD-10-CM

## 2022-11-03 DIAGNOSIS — D22 MELANOCYTIC NEVI: ICD-10-CM

## 2022-11-03 DIAGNOSIS — L81.4 OTHER MELANIN HYPERPIGMENTATION: ICD-10-CM

## 2022-11-03 PROBLEM — D22.5 MELANOCYTIC NEVI OF TRUNK: Status: ACTIVE | Noted: 2022-11-03

## 2022-11-03 PROCEDURE — 99213 OFFICE O/P EST LOW 20 MIN: CPT

## 2022-11-03 PROCEDURE — OTHER COUNSELING: OTHER

## 2022-11-03 PROCEDURE — OTHER PRESCRIPTION MEDICATION MANAGEMENT: OTHER

## 2022-11-03 ASSESSMENT — LOCATION SIMPLE DESCRIPTION DERM
LOCATION SIMPLE: RIGHT UPPER BACK
LOCATION SIMPLE: LEFT UPPER BACK
LOCATION SIMPLE: LEFT 3RD TOE

## 2022-11-03 ASSESSMENT — LOCATION ZONE DERM
LOCATION ZONE: TRUNK
LOCATION ZONE: TOE

## 2022-11-03 ASSESSMENT — LOCATION DETAILED DESCRIPTION DERM
LOCATION DETAILED: RIGHT SUPERIOR MEDIAL UPPER BACK
LOCATION DETAILED: LEFT MEDIAL UPPER BACK
LOCATION DETAILED: LEFT DORSAL 3RD TOE

## 2022-11-03 NOTE — PROCEDURE: PRESCRIPTION MEDICATION MANAGEMENT
Detail Level: Simple
Render In Strict Bullet Format?: No
Continue Regimen: Topical steroid from PCP prn for flares. Pt will call with name of Rx if she would like a refill

## 2022-11-17 ENCOUNTER — MED REC SCAN ONLY (OUTPATIENT)
Dept: INTERNAL MEDICINE CLINIC | Facility: CLINIC | Age: 42
End: 2022-11-17

## 2023-01-09 RX ORDER — LEVOCETIRIZINE DIHYDROCHLORIDE 5 MG/1
TABLET, FILM COATED ORAL
Qty: 90 TABLET | Refills: 0 | Status: SHIPPED | OUTPATIENT
Start: 2023-01-09

## 2023-01-30 RX ORDER — MONTELUKAST SODIUM 10 MG/1
TABLET ORAL
Qty: 90 TABLET | Refills: 0 | Status: SHIPPED | OUTPATIENT
Start: 2023-01-30

## 2023-01-30 RX ORDER — LEVOCETIRIZINE DIHYDROCHLORIDE 5 MG/1
TABLET, FILM COATED ORAL
Qty: 90 TABLET | Refills: 0 | Status: SHIPPED | OUTPATIENT
Start: 2023-01-30

## 2023-03-09 ENCOUNTER — OFFICE VISIT (OUTPATIENT)
Dept: ALLERGY | Facility: CLINIC | Age: 43
End: 2023-03-09

## 2023-03-09 VITALS — SYSTOLIC BLOOD PRESSURE: 137 MMHG | DIASTOLIC BLOOD PRESSURE: 79 MMHG | HEART RATE: 95 BPM

## 2023-03-09 DIAGNOSIS — Z92.29 COVID-19 VACCINE SERIES COMPLETED: ICD-10-CM

## 2023-03-09 DIAGNOSIS — J30.2 SEASONAL ALLERGIC RHINITIS, UNSPECIFIED TRIGGER: ICD-10-CM

## 2023-03-09 DIAGNOSIS — L50.5 CHOLINERGIC URTICARIA: Primary | ICD-10-CM

## 2023-03-09 DIAGNOSIS — J45.20 RAD (REACTIVE AIRWAY DISEASE), MILD INTERMITTENT, UNCOMPLICATED: ICD-10-CM

## 2023-03-09 DIAGNOSIS — Z88.0 H/O PENICILLIN-TYPE ANTIBIOTIC ALLERGY: ICD-10-CM

## 2023-03-09 PROCEDURE — 3075F SYST BP GE 130 - 139MM HG: CPT | Performed by: ALLERGY & IMMUNOLOGY

## 2023-03-09 PROCEDURE — 99214 OFFICE O/P EST MOD 30 MIN: CPT | Performed by: ALLERGY & IMMUNOLOGY

## 2023-03-09 PROCEDURE — 3078F DIAST BP <80 MM HG: CPT | Performed by: ALLERGY & IMMUNOLOGY

## 2023-03-09 RX ORDER — MONTELUKAST SODIUM 10 MG/1
10 TABLET ORAL NIGHTLY
Qty: 90 TABLET | Refills: 2 | Status: SHIPPED | OUTPATIENT
Start: 2023-03-09

## 2023-03-09 RX ORDER — LEVOCETIRIZINE DIHYDROCHLORIDE 5 MG/1
5 TABLET, FILM COATED ORAL 2 TIMES DAILY
Qty: 180 TABLET | Refills: 2 | Status: SHIPPED | OUTPATIENT
Start: 2023-03-09

## 2023-03-09 NOTE — PATIENT INSTRUCTIONS
#1 chronic urticaria with a cholinergic component  Continue with Xyzal once a day up to 4 times per day if needed. Prescription written for twice a day. Consider Xolair if refractory to above recommendations    #2 rhinitis  Prior serum IgE testing was negative. Continue with current regimen. Suspected nonallergic rhinitis with current Singulair and Xyzal.  May add Flonase or Nasacort if refractory. #3 reactive airway disease  No ED visits or prednisone in the interim. Denies symptoms more than 2 days/week. Continue with Singulair.   Albuterol as needed  Reviewed signs and symptoms of persistent asthma including the rules of 2    #4 COVID vaccines up-to-date    #5 flu vaccine in the fall    Follow-up in 1 year or sooner if needed

## 2023-04-07 ENCOUNTER — TELEPHONE (OUTPATIENT)
Dept: INTERNAL MEDICINE CLINIC | Facility: CLINIC | Age: 43
End: 2023-04-07

## 2023-04-07 DIAGNOSIS — E78.2 MIXED HYPERLIPIDEMIA: ICD-10-CM

## 2023-04-07 DIAGNOSIS — E04.1 THYROID NODULE: ICD-10-CM

## 2023-04-07 DIAGNOSIS — Z00.00 PHYSICAL EXAM, ANNUAL: ICD-10-CM

## 2023-04-07 DIAGNOSIS — D72.819 LEUKOPENIA, UNSPECIFIED TYPE: ICD-10-CM

## 2023-04-07 DIAGNOSIS — E55.9 VITAMIN D DEFICIENCY: Primary | ICD-10-CM

## 2023-04-07 DIAGNOSIS — R73.9 HYPERGLYCEMIA: ICD-10-CM

## 2023-04-07 PROBLEM — M20.11 ACQUIRED HALLUX VALGUS OF RIGHT FOOT: Status: ACTIVE | Noted: 2021-09-14

## 2023-04-07 PROBLEM — M77.51 CAPSULITIS OF METATARSOPHALANGEAL (MTP) JOINT OF RIGHT FOOT: Status: ACTIVE | Noted: 2021-09-14

## 2023-04-07 PROBLEM — D36.10 NEUROMA: Status: ACTIVE | Noted: 2021-09-14

## 2023-04-07 NOTE — TELEPHONE ENCOUNTER
Patient states she received 4th Covid booster in September will like to ask Dr. Abby Delgado if she recommends her getting one more booster.

## 2023-04-07 NOTE — TELEPHONE ENCOUNTER
Can order fasting labs to be done prior to office visit. But if there is other issues that need to be addressed at office visit she may need some other labs, so ideally it is best to do all labs together after the office visit but I will put the orders in.

## 2023-04-10 ENCOUNTER — TELEPHONE (OUTPATIENT)
Dept: INTERNAL MEDICINE CLINIC | Facility: CLINIC | Age: 43
End: 2023-04-10

## 2023-04-10 NOTE — TELEPHONE ENCOUNTER
Confusion only has 3 doses of vaccine last was December 16, 2021. Looking at her chart  must of updated that I see a booster done September 22, 2022. So she is receive the booster after September 1, 2022 she does not need another booster. As of now.

## 2023-04-10 NOTE — TELEPHONE ENCOUNTER
Pt informed that labs have been ordered but it's better for her to wait after appointment with Dr. Saira Aguillon.

## 2023-04-10 NOTE — TELEPHONE ENCOUNTER
Pt called back, stated she will still like another dose of the Booster since it's been 7 months since the last one 7 months ago, concern since she has big events ,family events, prone to Resp viruses , has allergies/asthma       [facility] will not let her make an appt since she had it the Bivolent booster, may need a letter from Dr Sosa has an Appt -Phx 4/24/23

## 2023-04-10 NOTE — TELEPHONE ENCOUNTER
There is some controversy regarding it. We are still recommending the new vaccine after September 2022.

## 2023-04-11 NOTE — TELEPHONE ENCOUNTER
One updated booster dose: If you have completed your updated booster dose, you are currently up to date. There is not a recommendation to get another updated booster dose.

## 2023-04-24 ENCOUNTER — OFFICE VISIT (OUTPATIENT)
Dept: INTERNAL MEDICINE CLINIC | Facility: CLINIC | Age: 43
End: 2023-04-24

## 2023-04-24 VITALS
OXYGEN SATURATION: 98 % | WEIGHT: 176 LBS | BODY MASS INDEX: 30.05 KG/M2 | HEART RATE: 108 BPM | TEMPERATURE: 98 F | SYSTOLIC BLOOD PRESSURE: 122 MMHG | DIASTOLIC BLOOD PRESSURE: 88 MMHG | HEIGHT: 64 IN | RESPIRATION RATE: 14 BRPM

## 2023-04-24 DIAGNOSIS — E78.2 MIXED HYPERLIPIDEMIA: ICD-10-CM

## 2023-04-24 DIAGNOSIS — D36.10 NEUROMA: ICD-10-CM

## 2023-04-24 DIAGNOSIS — Z80.3 FAMILY HISTORY OF MALIGNANT NEOPLASM OF BREAST: ICD-10-CM

## 2023-04-24 DIAGNOSIS — H52.10 MYOPIA, UNSPECIFIED LATERALITY: ICD-10-CM

## 2023-04-24 DIAGNOSIS — R31.29 OTHER MICROSCOPIC HEMATURIA: ICD-10-CM

## 2023-04-24 DIAGNOSIS — R73.9 HYPERGLYCEMIA: ICD-10-CM

## 2023-04-24 DIAGNOSIS — M20.11 ACQUIRED HALLUX VALGUS OF RIGHT FOOT: ICD-10-CM

## 2023-04-24 DIAGNOSIS — E04.1 THYROID NODULE: ICD-10-CM

## 2023-04-24 DIAGNOSIS — Z12.31 BREAST CANCER SCREENING BY MAMMOGRAM: ICD-10-CM

## 2023-04-24 DIAGNOSIS — M77.51 CAPSULITIS OF METATARSOPHALANGEAL (MTP) JOINT OF RIGHT FOOT: ICD-10-CM

## 2023-04-24 DIAGNOSIS — E55.9 VITAMIN D DEFICIENCY: ICD-10-CM

## 2023-04-24 DIAGNOSIS — J45.20 MILD INTERMITTENT ASTHMA WITHOUT COMPLICATION: Primary | ICD-10-CM

## 2023-04-24 DIAGNOSIS — Z00.00 PHYSICAL EXAM, ANNUAL: ICD-10-CM

## 2023-04-24 DIAGNOSIS — Z71.85 VACCINE COUNSELING: ICD-10-CM

## 2023-04-24 DIAGNOSIS — Z12.4 PAP SMEAR FOR CERVICAL CANCER SCREENING: ICD-10-CM

## 2023-04-24 LAB
APPEARANCE: CLEAR
BILIRUBIN: NEGATIVE
GLUCOSE (URINE DIPSTICK): NEGATIVE MG/DL
KETONES (URINE DIPSTICK): NEGATIVE MG/DL
LEUKOCYTES: NEGATIVE
MULTISTIX LOT#: ABNORMAL NUMERIC
NITRITE, URINE: NEGATIVE
PH, URINE: 6 (ref 4.5–8)
PROTEIN (URINE DIPSTICK): NEGATIVE MG/DL
SPECIFIC GRAVITY: 1.01 (ref 1–1.03)
URINE-COLOR: YELLOW
UROBILINOGEN,SEMI-QN: 0.2 MG/DL (ref 0–1.9)

## 2023-04-24 NOTE — PATIENT INSTRUCTIONS
ASSESSMENT/PLAN:   Mild intermittent asthma without complication  (primary encounter diagnosis) Stable. Vitamin d deficiency Check blood. Mixed hyperlipidemia Check blood. Hyperglycemia Check blood. Thyroid nodule Check blood. Vaccine counseling Covid booster. Family history of malignant neoplasm of breast    Physical exam, annual Check urine. Myopia, unspecified laterality Stable. FU optho. Breast cancer screening by mammogram  Significant family history of breast cancer. Saw a .  And now doing alternating mammogram and MRI every 6 months . Pap smear for cervical cancer screening Has FU gyne. For pap 7-23. Acquired hallux valgus of right foot  Neuroma  Capsulitis of metatarsophalangeal (mtp) joint of right foot Stable. Comfy shoes. Orders Placed This Encounter      URINALYSIS, AUTO, W/O SCOPE      Meds This Visit:  Requested Prescriptions      No prescriptions requested or ordered in this encounter       Imaging & Referrals:  None      RTC 1 yr.

## 2023-04-25 ENCOUNTER — LAB ENCOUNTER (OUTPATIENT)
Dept: LAB | Age: 43
End: 2023-04-25
Attending: INTERNAL MEDICINE
Payer: COMMERCIAL

## 2023-04-25 DIAGNOSIS — E78.2 MIXED HYPERLIPIDEMIA: ICD-10-CM

## 2023-04-25 DIAGNOSIS — E55.9 VITAMIN D DEFICIENCY: ICD-10-CM

## 2023-04-25 DIAGNOSIS — E04.1 THYROID NODULE: ICD-10-CM

## 2023-04-25 DIAGNOSIS — R73.9 HYPERGLYCEMIA: ICD-10-CM

## 2023-04-25 DIAGNOSIS — Z00.00 PHYSICAL EXAM, ANNUAL: ICD-10-CM

## 2023-04-25 DIAGNOSIS — D72.819 LEUKOPENIA, UNSPECIFIED TYPE: ICD-10-CM

## 2023-04-25 LAB
ALBUMIN SERPL-MCNC: 3.9 G/DL (ref 3.4–5)
ALBUMIN/GLOB SERPL: 1.2 {RATIO} (ref 1–2)
ALP LIVER SERPL-CCNC: 55 U/L
ALT SERPL-CCNC: 23 U/L
ANION GAP SERPL CALC-SCNC: 9 MMOL/L (ref 0–18)
AST SERPL-CCNC: 13 U/L (ref 15–37)
BASOPHILS # BLD AUTO: 0.01 X10(3) UL (ref 0–0.2)
BASOPHILS NFR BLD AUTO: 0.2 %
BILIRUB SERPL-MCNC: 0.6 MG/DL (ref 0.1–2)
BUN BLD-MCNC: 11 MG/DL (ref 7–18)
BUN/CREAT SERPL: 13.1 (ref 10–20)
CALCIUM BLD-MCNC: 9.1 MG/DL (ref 8.5–10.1)
CHLORIDE SERPL-SCNC: 109 MMOL/L (ref 98–112)
CHOLEST SERPL-MCNC: 204 MG/DL (ref ?–200)
CO2 SERPL-SCNC: 24 MMOL/L (ref 21–32)
CREAT BLD-MCNC: 0.84 MG/DL
DEPRECATED RDW RBC AUTO: 36.2 FL (ref 35.1–46.3)
EOSINOPHIL # BLD AUTO: 0.1 X10(3) UL (ref 0–0.7)
EOSINOPHIL NFR BLD AUTO: 2.1 %
ERYTHROCYTE [DISTWIDTH] IN BLOOD BY AUTOMATED COUNT: 11.9 % (ref 11–15)
EST. AVERAGE GLUCOSE BLD GHB EST-MCNC: 111 MG/DL (ref 68–126)
FASTING PATIENT LIPID ANSWER: YES
FASTING STATUS PATIENT QL REPORTED: YES
GFR SERPLBLD BASED ON 1.73 SQ M-ARVRAT: 88 ML/MIN/1.73M2 (ref 60–?)
GLOBULIN PLAS-MCNC: 3.3 G/DL (ref 2.8–4.4)
GLUCOSE BLD-MCNC: 109 MG/DL (ref 70–99)
HBA1C MFR BLD: 5.5 % (ref ?–5.7)
HCT VFR BLD AUTO: 39.2 %
HDLC SERPL-MCNC: 75 MG/DL (ref 40–59)
HGB BLD-MCNC: 13.1 G/DL
IMM GRANULOCYTES # BLD AUTO: 0.01 X10(3) UL (ref 0–1)
IMM GRANULOCYTES NFR BLD: 0.2 %
LDLC SERPL CALC-MCNC: 114 MG/DL (ref ?–100)
LYMPHOCYTES # BLD AUTO: 1.36 X10(3) UL (ref 1–4)
LYMPHOCYTES NFR BLD AUTO: 28.6 %
MCH RBC QN AUTO: 28.3 PG (ref 26–34)
MCHC RBC AUTO-ENTMCNC: 33.4 G/DL (ref 31–37)
MCV RBC AUTO: 84.7 FL
MONOCYTES # BLD AUTO: 0.32 X10(3) UL (ref 0.1–1)
MONOCYTES NFR BLD AUTO: 6.7 %
NEUTROPHILS # BLD AUTO: 2.96 X10 (3) UL (ref 1.5–7.7)
NEUTROPHILS # BLD AUTO: 2.96 X10(3) UL (ref 1.5–7.7)
NEUTROPHILS NFR BLD AUTO: 62.2 %
NONHDLC SERPL-MCNC: 129 MG/DL (ref ?–130)
OSMOLALITY SERPL CALC.SUM OF ELEC: 294 MOSM/KG (ref 275–295)
PLATELET # BLD AUTO: 195 10(3)UL (ref 150–450)
POTASSIUM SERPL-SCNC: 4.6 MMOL/L (ref 3.5–5.1)
PROT SERPL-MCNC: 7.2 G/DL (ref 6.4–8.2)
RBC # BLD AUTO: 4.63 X10(6)UL
SODIUM SERPL-SCNC: 142 MMOL/L (ref 136–145)
T4 FREE SERPL-MCNC: 1.1 NG/DL (ref 0.8–1.7)
TRIGL SERPL-MCNC: 82 MG/DL (ref 30–149)
TSI SER-ACNC: 3.85 MIU/ML (ref 0.36–3.74)
VIT D+METAB SERPL-MCNC: 23.6 NG/ML (ref 30–100)
VLDLC SERPL CALC-MCNC: 14 MG/DL (ref 0–30)
WBC # BLD AUTO: 4.8 X10(3) UL (ref 4–11)

## 2023-04-25 PROCEDURE — 85025 COMPLETE CBC W/AUTO DIFF WBC: CPT

## 2023-04-25 PROCEDURE — 36415 COLL VENOUS BLD VENIPUNCTURE: CPT

## 2023-04-25 PROCEDURE — 84439 ASSAY OF FREE THYROXINE: CPT

## 2023-04-25 PROCEDURE — 84443 ASSAY THYROID STIM HORMONE: CPT

## 2023-04-25 PROCEDURE — 83036 HEMOGLOBIN GLYCOSYLATED A1C: CPT

## 2023-04-25 PROCEDURE — 82306 VITAMIN D 25 HYDROXY: CPT

## 2023-04-25 PROCEDURE — 80053 COMPREHEN METABOLIC PANEL: CPT

## 2023-04-25 PROCEDURE — 80061 LIPID PANEL: CPT

## 2023-04-26 ENCOUNTER — IMMUNIZATION (OUTPATIENT)
Dept: LAB | Age: 43
End: 2023-04-26
Attending: EMERGENCY MEDICINE
Payer: COMMERCIAL

## 2023-04-26 DIAGNOSIS — Z23 NEED FOR VACCINATION: Primary | ICD-10-CM

## 2023-04-26 PROCEDURE — 0124A SARSCOV2 VAC BVL 30MCG/0.3ML: CPT

## 2023-04-27 NOTE — PROGRESS NOTES
CBC Normal (white blood cells and red blood cells and platelets),   CMP Normal (electrolytes, kidney and liver functions),   Lipid (choilesterol) is good,   Hemoglobin A1c which is a 3-month average of sugars looks good. Goal is 6.5 or less. Thyroid is very slightly underactive. Recheck in 3 months. Orders written. Vitamin D level is low. Goal is between 30 and 60. Would recommend vitamin D 400 IUs over-the-counter twice a day and recheck vitamin D level in 3 months. Orders written for future labs in 3 months.

## 2023-07-25 ENCOUNTER — MED REC SCAN ONLY (OUTPATIENT)
Dept: INTERNAL MEDICINE CLINIC | Facility: CLINIC | Age: 43
End: 2023-07-25

## 2023-08-04 ENCOUNTER — TELEMEDICINE (OUTPATIENT)
Dept: INTERNAL MEDICINE CLINIC | Facility: CLINIC | Age: 43
End: 2023-08-04

## 2023-08-04 DIAGNOSIS — E04.1 THYROID NODULE: ICD-10-CM

## 2023-08-04 DIAGNOSIS — Z12.31 BREAST CANCER SCREENING BY MAMMOGRAM: ICD-10-CM

## 2023-08-04 DIAGNOSIS — Z71.85 VACCINE COUNSELING: ICD-10-CM

## 2023-08-04 DIAGNOSIS — M54.2 NECK PAIN ON LEFT SIDE: ICD-10-CM

## 2023-08-04 DIAGNOSIS — E55.9 VITAMIN D DEFICIENCY: Primary | ICD-10-CM

## 2023-08-04 RX ORDER — TIZANIDINE 4 MG/1
4 TABLET ORAL NIGHTLY
Qty: 5 TABLET | Refills: 0 | Status: SHIPPED | OUTPATIENT
Start: 2023-08-04 | End: 2023-08-09

## 2023-08-04 NOTE — PROGRESS NOTES
Virtual Telephone Check-In    Grady Ovalles verbally consents to a Virtual [address] Check-In visit on 08/04/23. Patient understands and accepts financial responsibility for any deductible, co-insurance and/or co-pays associated with this service. Dr. Yael Zambrano location is at [place], [place]. The patient's location is at home in [place] and their identity has been established. The patient understands that we are limiting office visits due to the COVID-19 pandemic and was informed that consent to treat includes permission to submit charges to insurance. It was also shared that without being seen and evaluated in person, there is a risk that the information and/or assessment may be incomplete or inaccurate. Duration of the service: 2  :  58  PM -   3 :  20  PM.     Past Medical History:   Diagnosis Date    Asthma     Dysplasia of cervix     Mononucleosis     SEASONAL ALLERGIES     Seizure disorder ([facility] [address]     infantile treated to age 2    Thyroid nodule     teenager, suppressive treatment    Vocal cord cyst     [place] teeth extracted        Pcn [Penicillins]         Sulfa Antibiotics       ANXIETY    Current Outpatient Medications:     diclofenac 50 MG Oral Tab EC, Take 1 tablet (50 mg total) by mouth 2 (two) times daily for 5 days. , Disp: 10 tablet, Rfl: 0    tiZANidine 4 MG Oral Tab, Take 1 tablet (4 mg total) by mouth nightly for 5 days. , Disp: 5 tablet, Rfl: 0    triamcinolone 0.1 % External Ointment, Applied 2 times per day as needed, Disp: 60 g, Rfl: 0    montelukast 10 MG Oral Tab, Take 1 tablet (10 mg total) by mouth nightly., Disp: 90 tablet, Rfl: 2    levocetirizine 5 MG Oral Tab, Take 1 tablet (5 mg total) by mouth in the morning and 1 tablet (5 mg total) before bedtime. , Disp: 180 tablet, Rfl: 2    hydrocortisone 2.5 % External Ointment, Apply 1 Application topically 2 (two) times daily.  APPLY TO AFFECTED AREA (Patient not taking: Reported on 7/19/2022), Disp: , Rfl:     albuterol (PROAIR HFA) 108 (90 Base) MCG/ACT Inhalation Aero Soln, Inhale 2 puffs into the lungs every 6 (six) hours as needed for Wheezing., Disp: 1 each, Rfl: 0    Mometasone Furoate (NASONEX) 50 MCG/ACT Nasal Suspension, 2 sprays by Nasal route daily. , Disp: 3 Inhaler, Rfl: 2    Multiple Vitamins-Minerals (MULTIVITAMIN ADULT OR), Take by mouth daily. , Disp: , Rfl:     Summary of topics discussed: I, Dr. Filiberto Perez, spoke with pt. Neck pain X 1 month. Better. SP PT X 2 weeks at [facility]. Advil decrease but slightly. Denies numbness tingling. Initially thought slept wrong. First time has had this. Radiates down the left arm with numbness and tingling in the left arm. Right-handed. Denies weakness. Denies tremors. Review of Systems:    General: Denies fatigue, chills/fever, night sweats, weight loss, loss of appetite  Neurological: Denies frequent headaches  Cardiovascular: Denies leg swelling, chest pain or pressure after eating or when upset, irregular heart rate/palpitations, dizziness, N,V, exertional arm pain nor neck pain  Patient is alert and oriented x3. Able to speak in full sentence without short of breath. Alert and oriented on phone, no SOB or wheezing, not anxious  & MS sharp. Decrease left lateral flexion and looking to the left. Normal right lateral flexion of neck and posterior extension of neck and anterior flexion of neck. Full range of motion left shoulder. No fasciculations noted. Diagnoses and all orders for this visit:    Vitamin D deficiency Check blood. Thyroid nodule Check blood. Vaccine counseling up-to-date. Breast cancer screening by mammogram  -     [facility] JOSE 2D+3D SCREENING BILAT (CPT=77067/98070); Future  Check l mammogram. Continue self breast exam every month. Neck pain on left side  -     XR CERVICAL SPINE (2-3 VIEWS) (CPT=72040); Future  No motrin, ibuprofen, advil, alleve, naprosyn  with these medications.   Discussed with patient of side effects and use of these medications. Take Cataflam twice a day with food for 5 days. Take tizanidine 4 mg at night for 5 nights. Other orders  -     diclofenac 50 MG Oral Tab EC; Take 1 tablet (50 mg total) by mouth 2 (two) times daily for 5 days. -     tiZANidine 4 MG Oral Tab; Take 1 tablet (4 mg total) by mouth nightly for 5 days. Pt. aware to schedule follow up with OV and doing this telephone visit now due to current covid situation. Please note that the following visit was completed using a 2 way real-time interactive audio and video communication. This has been done in good chirag to provide continuity of care in the best interest of the patient provider relationship, due to ongoing public health crisis, National emergency and because of restrictions of visitation. There are limitations visit as no physical exam could be performed. Every conscious effort was taken to allow for sufficient and adequate time. This billing was spent on reviewing labs, medications, radiological test and decision making. Appropriate medical decision making and tests are ordered as detailed in the plan of care above. Mady Steve.  Diana Stiles MD

## 2023-08-07 ENCOUNTER — LAB ENCOUNTER (OUTPATIENT)
Dept: LAB | Facility: HOSPITAL | Age: 43
End: 2023-08-07
Attending: INTERNAL MEDICINE
Payer: COMMERCIAL

## 2023-08-07 ENCOUNTER — HOSPITAL ENCOUNTER (OUTPATIENT)
Dept: GENERAL RADIOLOGY | Facility: HOSPITAL | Age: 43
Discharge: HOME | End: 2023-08-07
Attending: INTERNAL MEDICINE
Payer: COMMERCIAL

## 2023-08-07 DIAGNOSIS — E55.9 VITAMIN D DEFICIENCY: ICD-10-CM

## 2023-08-07 DIAGNOSIS — M54.2 NECK PAIN ON LEFT SIDE: ICD-10-CM

## 2023-08-07 DIAGNOSIS — E04.1 THYROID NODULE: ICD-10-CM

## 2023-08-07 LAB
T4 FREE SERPL-MCNC: 1.1 NG/DL (ref 0.8–1.7)
TSI SER-ACNC: 3.36 MIU/ML (ref 0.36–3.74)
VIT D+METAB SERPL-MCNC: 35.3 NG/ML (ref 30–100)

## 2023-08-07 PROCEDURE — 72050 X-RAY EXAM NECK SPINE 4/5VWS: CPT | Performed by: INTERNAL MEDICINE

## 2023-08-07 PROCEDURE — 36415 COLL VENOUS BLD VENIPUNCTURE: CPT

## 2023-08-07 PROCEDURE — 84443 ASSAY THYROID STIM HORMONE: CPT

## 2023-08-07 PROCEDURE — 82306 VITAMIN D 25 HYDROXY: CPT

## 2023-08-07 PROCEDURE — 84439 ASSAY OF FREE THYROXINE: CPT

## 2023-08-08 ENCOUNTER — TELEPHONE (OUTPATIENT)
Facility: CLINIC | Age: 43
End: 2023-08-08

## 2023-08-08 NOTE — TELEPHONE ENCOUNTER
Action Requested: Summary for Provider     []  Critical Lab, Recommendations Needed  [] Need Additional Advice  [x]   FYI    []   Need Orders  [] Need Medications Sent to Pharmacy  []  Other     SUMMARY: Patient called back stated symptoms are the same due too she has not yet started the treatment.  out of town and can't solo parent with muscle relaxer on board    Physical therapist advised her to seek a recommendation for Neck spine MD.    Current in PT    Will call back once just has completed treatment    Reason for call: No chief complaint on file.   Onset: Data Unavailable

## 2023-08-31 ENCOUNTER — TELEPHONE (OUTPATIENT)
Dept: PHYSICAL MEDICINE AND REHAB | Facility: CLINIC | Age: 43
End: 2023-08-31

## 2023-09-14 ENCOUNTER — HOSPITAL ENCOUNTER (OUTPATIENT)
Dept: MRI IMAGING | Age: 43
Discharge: HOME | End: 2023-09-14
Attending: INTERNAL MEDICINE
Payer: COMMERCIAL

## 2023-09-14 DIAGNOSIS — R20.0 NUMBNESS AND TINGLING: ICD-10-CM

## 2023-09-14 DIAGNOSIS — R20.2 NUMBNESS AND TINGLING: ICD-10-CM

## 2023-09-14 PROCEDURE — 72141 MRI NECK SPINE W/O DYE: CPT | Performed by: INTERNAL MEDICINE

## 2023-09-22 ENCOUNTER — OFFICE VISIT (OUTPATIENT)
Dept: PHYSICAL MEDICINE AND REHAB | Facility: CLINIC | Age: 43
End: 2023-09-22
Payer: COMMERCIAL

## 2023-09-22 VITALS
WEIGHT: 176 LBS | OXYGEN SATURATION: 98 % | HEART RATE: 77 BPM | SYSTOLIC BLOOD PRESSURE: 102 MMHG | HEIGHT: 64 IN | BODY MASS INDEX: 30.05 KG/M2 | DIASTOLIC BLOOD PRESSURE: 78 MMHG

## 2023-09-22 DIAGNOSIS — M54.12 CERVICAL RADICULOPATHY: Primary | ICD-10-CM

## 2023-09-22 DIAGNOSIS — M47.812 ARTHROPATHY OF CERVICAL FACET JOINT: ICD-10-CM

## 2023-09-22 PROCEDURE — 3008F BODY MASS INDEX DOCD: CPT | Performed by: PHYSICAL MEDICINE & REHABILITATION

## 2023-09-22 PROCEDURE — 3078F DIAST BP <80 MM HG: CPT | Performed by: PHYSICAL MEDICINE & REHABILITATION

## 2023-09-22 PROCEDURE — 99204 OFFICE O/P NEW MOD 45 MIN: CPT | Performed by: PHYSICAL MEDICINE & REHABILITATION

## 2023-09-22 PROCEDURE — 3074F SYST BP LT 130 MM HG: CPT | Performed by: PHYSICAL MEDICINE & REHABILITATION

## 2023-09-22 RX ORDER — PREGABALIN 50 MG/1
50 CAPSULE ORAL 2 TIMES DAILY
Qty: 60 CAPSULE | Refills: 0 | Status: SHIPPED | OUTPATIENT
Start: 2023-09-22

## 2023-10-09 ENCOUNTER — MED REC SCAN ONLY (OUTPATIENT)
Dept: PHYSICAL MEDICINE AND REHAB | Facility: CLINIC | Age: 43
End: 2023-10-09

## 2023-11-02 ENCOUNTER — OFFICE VISIT (OUTPATIENT)
Dept: PHYSICAL MEDICINE AND REHAB | Facility: CLINIC | Age: 43
End: 2023-11-02
Payer: COMMERCIAL

## 2023-11-02 VITALS
SYSTOLIC BLOOD PRESSURE: 134 MMHG | BODY MASS INDEX: 30.05 KG/M2 | WEIGHT: 176 LBS | DIASTOLIC BLOOD PRESSURE: 82 MMHG | HEIGHT: 64 IN

## 2023-11-02 DIAGNOSIS — M54.12 CERVICAL RADICULOPATHY: Primary | ICD-10-CM

## 2023-11-02 DIAGNOSIS — M54.2 TRIGGER POINT OF NECK: ICD-10-CM

## 2023-11-02 DIAGNOSIS — M47.812 ARTHROPATHY OF CERVICAL FACET JOINT: ICD-10-CM

## 2023-11-02 RX ORDER — LIDOCAINE HYDROCHLORIDE 10 MG/ML
3 INJECTION, SOLUTION INFILTRATION; PERINEURAL ONCE
Status: COMPLETED | OUTPATIENT
Start: 2023-11-02 | End: 2023-11-02

## 2023-11-02 NOTE — PATIENT INSTRUCTIONS
Katty Degroot PT and continue home exercises  -Can consider starting Lyrica and epidural injection if no better  -Can consider OMM for further treatment  -Follow up as needed  -Can consider acupuncture if needed  -Cupping could be considered  -Integrative medicine consultation   -Follow up in 6 months

## 2023-11-03 ENCOUNTER — APPOINTMENT (OUTPATIENT)
Dept: URBAN - METROPOLITAN AREA CLINIC 244 | Age: 43
Setting detail: DERMATOLOGY
End: 2023-11-03

## 2023-11-03 DIAGNOSIS — D22 MELANOCYTIC NEVI: ICD-10-CM

## 2023-11-03 DIAGNOSIS — R21 RASH AND OTHER NONSPECIFIC SKIN ERUPTION: ICD-10-CM

## 2023-11-03 DIAGNOSIS — L81.4 OTHER MELANIN HYPERPIGMENTATION: ICD-10-CM

## 2023-11-03 PROBLEM — D22.5 MELANOCYTIC NEVI OF TRUNK: Status: ACTIVE | Noted: 2023-11-03

## 2023-11-03 PROCEDURE — OTHER MIPS QUALITY: OTHER

## 2023-11-03 PROCEDURE — OTHER COUNSELING: OTHER

## 2023-11-03 PROCEDURE — OTHER PRESCRIPTION: OTHER

## 2023-11-03 PROCEDURE — 99213 OFFICE O/P EST LOW 20 MIN: CPT

## 2023-11-03 PROCEDURE — OTHER ADDITIONAL NOTES: OTHER

## 2023-11-03 RX ORDER — TRIAMCINOLONE ACETONIDE 1 MG/G
OINTMENT TOPICAL
Qty: 15 | Refills: 0 | Status: ERX | COMMUNITY
Start: 2023-11-03

## 2023-11-03 ASSESSMENT — LOCATION SIMPLE DESCRIPTION DERM
LOCATION SIMPLE: ABDOMEN
LOCATION SIMPLE: RIGHT LIP

## 2023-11-03 ASSESSMENT — LOCATION ZONE DERM
LOCATION ZONE: TRUNK
LOCATION ZONE: LIP

## 2023-11-03 ASSESSMENT — LOCATION DETAILED DESCRIPTION DERM
LOCATION DETAILED: RIGHT INFERIOR VERMILION LIP
LOCATION DETAILED: PERIUMBILICAL SKIN

## 2023-11-03 NOTE — PROCEDURE: ADDITIONAL NOTES
Render Risk Assessment In Note?: no
Detail Level: Simple
Additional Notes: Discussed patch testing. Pt can call if she wants Tacrolimus sent

## 2023-11-06 NOTE — PROCEDURES
Procedure: Trigger point injections    Indication: bilateral low back pain    Consent: Informed consent was obtained from patient. Patient was explained the risks, benefits and alternatives of procedure, risks including but not limited to bleeding, infection, damage to surrounding structures, allergy / anaphylaxis, vasovagal reaction, worsening pain. She was given the opportunity to ask questions, and gave consent. Signed consent placed in chart. Procedure: Correct patient, procedure, and site was verified. Painful tender / trigger points were identified by palpation and pain response in the left scalene, sternocleidomastoid and levator scapula muscles. Alcohol was applied topically to the skin. Using a 25 gauge needle (aspirating during insertion), 0.5 cc of 1% lidocaine  was injected into each trigger point. Pressure with a gauze pad was held briefly upon the site of puncture to minimize bleeding.     Complications: Patient tolerated procedure well with no immediate complications    Estimated blood loss: minimal    Disposition: home; patient educated on and given written home care instructions    Follow up: 4 weeks    Arlin Nicole DO, COLLEEN & CAQSM  Physical Medicine and Rehabilitation/Sports Medicine  MEDICAL [facility]

## 2023-11-07 ENCOUNTER — TELEPHONE (OUTPATIENT)
Dept: PHYSICAL MEDICINE AND REHAB | Facility: CLINIC | Age: 43
End: 2023-11-07

## 2023-11-07 NOTE — TELEPHONE ENCOUNTER
Initiated authorization for Left scalene and SCM trigger point injection  procedure.  Dx code M54.3 with [employer] Plan CPT Code 88651  Reference # [id number]   DJJWI 11/7/23-2/7/24  Status No Auth required

## 2023-11-14 ENCOUNTER — MED REC SCAN ONLY (OUTPATIENT)
Dept: PHYSICAL MEDICINE AND REHAB | Facility: CLINIC | Age: 43
End: 2023-11-14

## 2023-11-30 ENCOUNTER — TELEMEDICINE (OUTPATIENT)
Dept: PHYSICAL MEDICINE AND REHAB | Facility: CLINIC | Age: 43
End: 2023-11-30
Payer: COMMERCIAL

## 2023-11-30 DIAGNOSIS — M54.12 CERVICAL RADICULOPATHY: Primary | ICD-10-CM

## 2023-11-30 DIAGNOSIS — M47.812 ARTHROPATHY OF CERVICAL FACET JOINT: ICD-10-CM

## 2023-11-30 DIAGNOSIS — M54.2 TRIGGER POINT OF NECK: ICD-10-CM

## 2023-11-30 NOTE — PROGRESS NOTES
[address]    Telemedicine Visit - Follow Up Evaluation    Telehealth Verbal Consent   I conducted a telehealth visit with Sana Sevilla today, 11/30/23, which was completed using two-way, real-time interactive audio and video communication. This has been done in good chirag to provide continuity of care in the best interest of the provider-patient relationship, due to the COVID -19 public health crisis/national emergency where restrictions of face-to-face office visits are ongoing. Every conscious effort was taken to allow for sufficient and adequate time to complete the visit. The patient was made aware of the limitations of the telehealth visit, including treatment limitations as no physical exam could be performed. The patient was advised to call 911 or to go to the ER in case there was an emergency. The patient was also advised of the potential privacy & security concerns related to the telehealth platform. The patient was made aware of where to find [facility] notice of privacy practices, telehealth consent form and other related consent forms and documents. which are located on the [facility] website. The patient verbally agreed to telehealth consent form, related consents and the risks discussed. Lastly, the patient confirmed that they were in [place]. Included in this visit, time may have been spent reviewing labs, medications, radiology tests and decision making. Appropriate medical decision-making and tests are ordered as detailed in the plan of care above. Coding/billing information is submitted for this visit based on complexity of care and/or time spent for the visit. HISTORY OF PRESENT ILLNESS:     Patient is following up after trigger point injection. She states for few weeks after the trigger point she had soreness ongoing in the muscle however now it feels much similar to her other side. She has responded really well to it.   She feels about 85% improvement overall. She denies any radiating symptoms but does have occasional trigger point exacerbation during PT they can radiate down the left arm. Mainly the pain is localized in the left sided neck in the cervical paraspinal and upper trap region. She is not taking any medication for the pain either at this time. She continues PT and home exercises. Overall she is very happy with her progress      IMAGING:   No new imaging    All imaging results were reviewed and discussed with patient. ASSESSMENT:     1. Cervical radiculopathy    2. Arthropathy of cervical facet joint    3. Trigger point of neck          PLAN:   Miriam Velasquez is a 39year old female following up for left-sided neck pain with cervical radiculopathy and trigger point. She has responded really well to the injection. Recommend that she continue PT for the next 4 weeks and home exercises after. She will follow-up with me in the new year if the left-sided neck pain in the cervical paraspinal is triggered for which we could consider trigger point injection. She will continue myofascial treatment as well at home and topical treatment as tolerated and NSAID as needed. Follow-up:   PRN    We discussed that a telemedicine visit is in place of an office visit; however, this limits the ability to perform a thorough physical examination which may affect objective findings related to a specific condition and can affect treatment. The patient verbalized understanding with this plan and was in agreement. There are no barriers to learning. All questions were answered. Please note Dragon dictation software was used to dictate this note which may result in inadvertent typos. Ryan Mesa & CAQSM  Physical Medicine and Rehabilitation/Sports Medicine    PAST MEDICAL HISTORY:     Past Medical History:   Diagnosis Date    Asthma     Dysplasia of cervix     Mononucleosis     SEASONAL ALLERGIES     Seizure disorder ([facility] [address]     infantile treated to age 2    Thyroid nodule     teenager, suppressive treatment    Vocal cord cyst     [place]hart teeth extracted          PAST SURGICAL HISTORY:     Past Surgical History:   Procedure Laterality Date    CARPAL TUNNEL RELEASE      COLPOSCOPY,BX CERVIX/ENDOCERV CURR      OTHER SURGICAL HISTORY      Vocal cord cyst removal    TONSILLECTOMY  1988         CURRENT MEDICATIONS:     Current Outpatient Medications   Medication Sig Dispense Refill    pregabalin (LYRICA) 50 MG Oral Cap Take 1 capsule (50 mg total) by mouth 2 (two) times daily. 60 capsule 0    triamcinolone 0.1 % External Ointment Applied 2 times per day as needed 60 g 0    montelukast 10 MG Oral Tab Take 1 tablet (10 mg total) by mouth nightly. 90 tablet 2    levocetirizine 5 MG Oral Tab Take 1 tablet (5 mg total) by mouth in the morning and 1 tablet (5 mg total) before bedtime. 180 tablet 2    Mometasone Furoate (NASONEX) 50 MCG/ACT Nasal Suspension 2 sprays by Nasal route daily. 3 Inhaler 2    Multiple Vitamins-Minerals (MULTIVITAMIN ADULT OR) Take by mouth daily.            ALLERGIES:     Allergies   Allergen Reactions    Pcn [Penicillins]     Sulfa Antibiotics ANXIETY         FAMILY HISTORY:     Family History   Problem Relation Age of Onset    Cancer Father 72        prostate ca, prostatectomy    Hypertension Father     Allergies Mother     Breast Cancer Maternal Grandmother 46        d.54    Stroke Maternal Grandfather     Heart Attack Maternal Grandfather     Cancer Maternal Grandfather 46        bladder ca; non-smoker; ; d.90    Stroke Paternal Grandmother     Breast Cancer Maternal Aunt 43        BRCA1/2 neg in 2001    Breast Cancer Maternal Aunt 46        others dx 60,63    Breast Cancer Maternal Cousin Female 48        others dx 47, 64    Cancer Maternal Uncle 64        bladder ca; non-smoker          SOCIAL HISTORY:     Social History     Socioeconomic History    Marital status:    Tobacco Use Smoking status: Never    Smokeless tobacco: Never    Tobacco comments:     No household smokers.     Vaping Use    Vaping Use: Never used   Substance and Sexual Activity    Alcohol use: Not Currently     Comment: socially    Drug use: No   Other Topics Concern    Caffeine Concern Yes     Comment: Coffee, soda           REVIEW OF SYSTEMS:   As noted in HPI      PHYSICAL EXAM:   General: No immediate distress  Head: Normocephalic/ Atraumatic  Eyes: Extra-occular movements intact  Ears/Nose/Throat:  External appearance identifies normal appearance without obvious deformity  Cardiovascular: No cyanosis, clubbing or edema  Respiratory: Non-labored respirations  Skin: No lesions noted   Neurological: alert & oriented x 3, attentive, able to follow commands, comprehention intact, spontaneous speech intact  Psychiatric: Mood and affect appropriate  Musculoskeletal Exam:  No change since last exam        LABS:     Lab Results   Component Value Date     04/25/2023    A1C 5.5 04/25/2023     Lab Results   Component Value Date    WBC 4.8 04/25/2023    RBC 4.63 04/25/2023    HGB 13.1 04/25/2023    HCT 39.2 04/25/2023    MCV 84.7 04/25/2023    MCH 28.3 04/25/2023    MCHC 33.4 04/25/2023    RDW 11.9 04/25/2023    .0 04/25/2023    MPV 8.6 04/08/2017     Lab Results   Component Value Date     (H) 04/25/2023    BUN 11 04/25/2023    BUNCREA 13.1 04/25/2023    CREATSERUM 0.84 04/25/2023    ANIONGAP 9 04/25/2023    GFRNAA 89 11/11/2021    GFRAA 102 11/11/2021    CA 9.1 04/25/2023    OSMOCALC 294 04/25/2023    ALKPHO 55 04/25/2023    AST 13 (L) 04/25/2023    ALT 23 04/25/2023    ALKPHOS 40 07/26/2012    BILT 0.6 04/25/2023    TP 7.2 04/25/2023    ALB 3.9 04/25/2023    GLOBULIN 3.3 04/25/2023    AGRATIO 1.7 07/26/2012     04/25/2023    K 4.6 04/25/2023     04/25/2023    CO2 24.0 04/25/2023     No results found for: \"PTP\", \"PT\", \"INR\"  Lab Results   Component Value Date    VITD 35.3 08/07/2023

## 2023-12-14 ENCOUNTER — MED REC SCAN ONLY (OUTPATIENT)
Dept: PHYSICAL MEDICINE AND REHAB | Facility: CLINIC | Age: 43
End: 2023-12-14

## 2024-02-05 RX ORDER — MONTELUKAST SODIUM 10 MG/1
10 TABLET ORAL NIGHTLY
Qty: 90 TABLET | Refills: 0 | Status: SHIPPED | OUTPATIENT
Start: 2024-02-05

## 2024-02-05 NOTE — TELEPHONE ENCOUNTER
Refill requested for    Name from pharmacy: MONTELUKAST 10MG TABLETS         Will file in chart as: MONTELUKAST 10 MG Oral Tab    Sig: TAKE 1 TABLET(10 MG) BY MOUTH EVERY NIGHT    Disp: 90 tablet    Refills: 2 (Pharmacy requested: Not specified)    Start: 2/4/2024    Class: Normal    Non-formulary    Last ordered: 11 months ago (3/9/2023) by Rashad Balderas MD    Last refill: 11/18/2023    Rx #: [id number]    Corticosteroids / Long-Acting Bronchodilators [id number]/2024 05:46 AM   Protocol Details Appt in past 6 mos or next 3 mos      To be filled at: [id number], [place] - [address] AT [facility], [phone], [phone]          Last office visit: 03/09/23    Previously advised to follow up in Follow-up in 1 year or sooner if needed     F/U currently scheduled?     ACTION: Routed to Dr. Balderas for review.

## 2024-02-12 PROBLEM — Z12.39 BREAST CANCER SCREENING, HIGH RISK PATIENT: Status: ACTIVE | Noted: 2024-02-12

## 2024-03-07 ENCOUNTER — MED REC SCAN ONLY (OUTPATIENT)
Dept: PHYSICAL MEDICINE AND REHAB | Facility: CLINIC | Age: 44
End: 2024-03-07

## 2024-04-25 ENCOUNTER — OFFICE VISIT (OUTPATIENT)
Dept: INTERNAL MEDICINE CLINIC | Facility: CLINIC | Age: 44
End: 2024-04-25

## 2024-04-25 VITALS
HEART RATE: 93 BPM | DIASTOLIC BLOOD PRESSURE: 80 MMHG | OXYGEN SATURATION: 100 % | WEIGHT: 185.19 LBS | SYSTOLIC BLOOD PRESSURE: 121 MMHG | TEMPERATURE: 98 F | BODY MASS INDEX: 31.62 KG/M2 | HEIGHT: 64 IN

## 2024-04-25 DIAGNOSIS — R73.9 HYPERGLYCEMIA: ICD-10-CM

## 2024-04-25 DIAGNOSIS — Z12.31 BREAST CANCER SCREENING BY MAMMOGRAM: ICD-10-CM

## 2024-04-25 DIAGNOSIS — Z71.85 VACCINE COUNSELING: ICD-10-CM

## 2024-04-25 DIAGNOSIS — E55.9 VITAMIN D DEFICIENCY: Primary | ICD-10-CM

## 2024-04-25 DIAGNOSIS — Z12.4 PAP SMEAR FOR CERVICAL CANCER SCREENING: ICD-10-CM

## 2024-04-25 DIAGNOSIS — E04.1 THYROID NODULE: ICD-10-CM

## 2024-04-25 DIAGNOSIS — Z12.83 SKIN EXAM FOR MALIGNANT NEOPLASM: ICD-10-CM

## 2024-04-25 DIAGNOSIS — J45.20 MILD INTERMITTENT ASTHMA WITHOUT COMPLICATION (HCC): ICD-10-CM

## 2024-04-25 DIAGNOSIS — Z80.3 FAMILY HISTORY OF MALIGNANT NEOPLASM OF BREAST: ICD-10-CM

## 2024-04-25 DIAGNOSIS — E78.2 MIXED HYPERLIPIDEMIA: ICD-10-CM

## 2024-04-25 DIAGNOSIS — Z12.11 COLON CANCER SCREENING: ICD-10-CM

## 2024-04-25 DIAGNOSIS — Z00.00 PHYSICAL EXAM, ANNUAL: ICD-10-CM

## 2024-04-25 LAB
APPEARANCE: CLEAR
BILIRUBIN: NEGATIVE
GLUCOSE (URINE DIPSTICK): NEGATIVE MG/DL
KETONES (URINE DIPSTICK): NEGATIVE MG/DL
LEUKOCYTES: NEGATIVE
MULTISTIX LOT#: NORMAL NUMERIC
NITRITE, URINE: NEGATIVE
OCCULT BLOOD: NEGATIVE
PH, URINE: 6 (ref 4.5–8)
PROTEIN (URINE DIPSTICK): NEGATIVE MG/DL
SPECIFIC GRAVITY: 1.01 (ref 1–1.03)
URINE-COLOR: YELLOW
UROBILINOGEN,SEMI-QN: 0.2 MG/DL (ref 0–1.9)

## 2024-04-25 PROCEDURE — 96127 BRIEF EMOTIONAL/BEHAV ASSMT: CPT | Performed by: INTERNAL MEDICINE

## 2024-04-25 PROCEDURE — 99214 OFFICE O/P EST MOD 30 MIN: CPT | Performed by: INTERNAL MEDICINE

## 2024-04-25 PROCEDURE — 3079F DIAST BP 80-89 MM HG: CPT | Performed by: INTERNAL MEDICINE

## 2024-04-25 PROCEDURE — 3074F SYST BP LT 130 MM HG: CPT | Performed by: INTERNAL MEDICINE

## 2024-04-25 PROCEDURE — 3008F BODY MASS INDEX DOCD: CPT | Performed by: INTERNAL MEDICINE

## 2024-04-25 PROCEDURE — 81003 URINALYSIS AUTO W/O SCOPE: CPT | Performed by: INTERNAL MEDICINE

## 2024-04-25 PROCEDURE — 99396 PREV VISIT EST AGE 40-64: CPT | Performed by: INTERNAL MEDICINE

## 2024-04-25 NOTE — PATIENT INSTRUCTIONS
ASSESSMENT/PLAN:     Encounter Diagnoses   Name Primary?    Vitamin D deficiency Stable.    Yes    Family history of malignant neoplasm of breast       Mild intermittent asthma without complication (HCC) Stable.        Vaccine counseling Up to date.        Mixed hyperlipidemia Check blood.        Thyroid nodule Stable.        Physical exam, annual Check urine.        Breast cancer screening by mammogram Normal mammogram. Continue self breast exam every month.         Hyperglycemia Check blood.        Pap smear for cervical cancer screening Up to date.        Colon cancer screening FU GI for colonoscopy.       R toe numbness. Fu podiatry.     Skin exam. Fu derm.     Anal pruritus. Sitz baths.  Prep H.     Orders Placed This Encounter   Procedures    Lipid Panel    CBC With Differential With Platelet    Comp Metabolic Panel (14)    Hemoglobin A1C    URINALYSIS, AUTO, W/O SCOPE    TSH W Reflex To Free T4    Free T4, (Free Thyroxine)    Vitamin D       Meds This Visit:  Requested Prescriptions      No prescriptions requested or ordered in this encounter       Imaging & Referrals:  GASTRO - INTERNAL  DERM - INTERNAL  OP REFERRAL TO [facility] TELEPHONE COLON SCREEN      RTC 1 yr. for physical.   Blood done to be after 8-7-24.

## 2024-04-25 NOTE — PROGRESS NOTES
HPI:    Patient ID: Melissa Anderson is a 44 year old female.    Melissa Anderson is a 44 year old female who presents for a complete physical exam.   HPI:     Chief Complaint   Patient presents with    Physical     Patient states she is here for Annual Physical Examination.    Calcium and Vit d intake:   Steroid use,  PPI use, chronic pain medication use, Anti-epileptics use :  RA: No.   Exercise: Yes.   She is postmenopausal No.   Maternal history of osteoporosis Yes.   History of falls/ fractures:  No.   History of kidney stones No.     Current Smoking No.   Excess alcohol No.   History of thyroid disease  No.   History of calcium problems: No.    History of Malabsorption/ bariatric surgery: No.      No extensive dental work in the past: root canals, bridges. She also has TMJ  No h/o radiation No.   H/o heart burn: not on a regular basis Yes.   No LMP recorded. LNMP:4-11-24,  qmonth X 2-3 days light.      /80 (BP Location: Right arm, Patient Position: Sitting, Cuff Size: large)   Pulse 93   Temp 97.9 °F (36.6 °C) (Oral)   Ht 5' 4\" (1.626 m)   Wt 185 lb 3.2 oz (84 kg)   SpO2 100%   BMI 31.79 kg/m²    Wt Readings from Last 4 Encounters:   04/25/24 185 lb 3.2 oz (84 kg)   11/02/23 176 lb (79.8 kg)   09/22/23 176 lb (79.8 kg)   04/24/23 176 lb (79.8 kg)     Body mass index is 31.79 kg/m².     Labs:   Lab Results   Component Value Date/Time    WBC 4.8 04/25/2023 08:47 AM    HGB 13.1 04/25/2023 08:47 AM    .0 04/25/2023 08:47 AM      Lab Results   Component Value Date/Time     (H) 04/25/2023 08:47 AM     04/25/2023 08:47 AM    K 4.6 04/25/2023 08:47 AM     04/25/2023 08:47 AM    CO2 24.0 04/25/2023 08:47 AM    CREATSERUM 0.84 04/25/2023 08:47 AM    CA 9.1 04/25/2023 08:47 AM    ALB 3.9 04/25/2023 08:47 AM    TP 7.2 04/25/2023 08:47 AM    ALKPHO 55 04/25/2023 08:47 AM    AST 13 (L) 04/25/2023 08:47 AM    ALT 23 04/25/2023 08:47 AM    BILT 0.6 04/25/2023 08:47 AM    TSH 3.360  08/07/2023 09:22 AM    T4F 1.1 08/07/2023 09:22 AM        Lab Results   Component Value Date/Time    CHOLEST 204 (H) 04/25/2023 08:47 AM    HDL 75 (H) 04/25/2023 08:47 AM    TRIG 82 04/25/2023 08:47 AM     (H) 04/25/2023 08:47 AM    NONHDLC 129 04/25/2023 08:47 AM       Lab Results   Component Value Date/Time    A1C 5.5 04/25/2023 08:47 AM      Lab Results   Component Value Date    VITD 35.3 08/07/2023         Health Maintenance   Topic Date Due    Mammogram  09/25/2022       Current Outpatient Medications   Medication Sig Dispense Refill    montelukast 10 MG Oral Tab Take 1 tablet (10 mg total) by mouth nightly. 30 tablet 0    levocetirizine 5 MG Oral Tab Take 1 tablet (5 mg total) by mouth in the morning and 1 tablet (5 mg total) before bedtime. 180 tablet 2    Mometasone Furoate (NASONEX) 50 MCG/ACT Nasal Suspension 2 sprays by Nasal route daily. 3 Inhaler 2    Multiple Vitamins-Minerals (MULTIVITAMIN ADULT OR) Take by mouth daily.        Past Medical History:    Asthma (HCC)    Dysplasia of cervix    Mononucleosis    SEASONAL ALLERGIES    Seizure disorder (HCC)    infantile treated to age 3    Thyroid nodule    teenager, suppressive treatment    Vocal cord cyst    [place] teeth extracted      Past Surgical History:   Procedure Laterality Date    Carpal tunnel release      Colposcopy,bx cervix/endocerv curr      Other surgical history      Vocal cord cyst removal    Tonsillectomy  1988      Family History   Problem Relation Age of Onset    Allergies Mother     Other (osteoporosis.) Mother     Cancer Father 65        prostate ca, prostatectomy    Hypertension Father     Breast Cancer Maternal Grandmother 52        d.54    Stroke Maternal Grandfather     Heart Attack Maternal Grandfather     Cancer Maternal Grandfather 52        bladder ca; non-smoker; ; d.90    Stroke Paternal Grandmother     Breast Cancer Maternal Aunt 42        BRCA1/2 neg in 2001    Breast Cancer Maternal Aunt 51        others dx  60,63    Cancer Maternal Uncle 64        bladder ca; non-smoker    Breast Cancer Maternal Cousin Female 50        others dx 54, 56      Social History:  Social History     Socioeconomic History    Marital status:    Tobacco Use    Smoking status: Never    Smokeless tobacco: Never    Tobacco comments:     No household smokers.    Vaping Use    Vaping status: Never Used   Substance and Sexual Activity    Alcohol use: Not Currently     Comment: socially    Drug use: No   Other Topics Concern    Caffeine Concern Yes     Comment: Coffee, soda          OB History    Para Term  AB Living   1 1 0 1 0 1   SAB IAB Ectopic Multiple Live Births   0 0 0 0 1    35.5 weeks male child.    X 2.  Did not breast feed. Pumped X 6 weeks.     Hx of Pap: all Paps normal       [facility]     Labs:   Lab Results   Component Value Date/Time     (H) 2023 08:47 AM     2023 08:47 AM    K 4.6 2023 08:47 AM     2023 08:47 AM    CO2 24.0 2023 08:47 AM    CREATSERUM 0.84 2023 08:47 AM    CA 9.1 2023 08:47 AM    AST 13 (L) 2023 08:47 AM    ALT 23 2023 08:47 AM    TSH 3.360 2023 09:22 AM    T4F 1.1 2023 09:22 AM        Lab Results   Component Value Date/Time    CHOLEST 204 (H) 2023 08:47 AM    HDL 75 (H) 2023 08:47 AM    TRIG 82 2023 08:47 AM     (H) 2023 08:47 AM    NONHDLC 129 2023 08:47 AM           Neck pain is 90%. SP PT. Trigger point injections with PMR. Chose not meds.     Anal pruritus. Since child delivery. Not notices with sensitive baby wipes. Afraid of steroids.     Is attempting to stand and does not burn.  But usually tans.  Has not seen a dermatologist least once a year for years to do skin checks.  Would like a referral to a female dermatologist to do the skin exam.    Numbness  This is a chronic problem. The current episode started more than 1 year ago.  The problem occurs intermittently. The problem has been waxing and waning. Associated symptoms include numbness. Pertinent negatives include no abdominal pain, chest pain, chills, congestion, coughing, diaphoresis, fatigue, fever, headaches, nausea, rash, sore throat, vomiting or weakness. Nothing aggravates the symptoms. She has tried nothing (6 history of fracture and right fifth toe.  Does not where the numbness is numbness is between the second and third toe on the plantar aspect of the foot.  Change issues.  Seeing podiatrist.  Has orthotics.  Does not like to use orthotics.) for the symptoms.         Review of Systems   Constitutional:  Negative for activity change, appetite change, chills, diaphoresis, fatigue, fever and unexpected weight change.   HENT:  Negative for congestion, dental problem, drooling, ear discharge, ear pain, facial swelling, hearing loss, mouth sores, nosebleeds, postnasal drip, rhinorrhea, sinus pressure, sinus pain, sneezing, sore throat, tinnitus, trouble swallowing and voice change.    Eyes:  Negative for photophobia, pain, discharge, redness, itching and visual disturbance.   Respiratory:  Negative for apnea, cough, choking, chest tightness, shortness of breath, wheezing and stridor.    Cardiovascular:  Negative for chest pain, palpitations and leg swelling.   Gastrointestinal:  Negative for abdominal distention, abdominal pain, anal bleeding, blood in stool, constipation, diarrhea, nausea, rectal pain and vomiting.   Endocrine: Negative for cold intolerance, heat intolerance, polydipsia, polyphagia and polyuria.   Genitourinary:  Negative for decreased urine volume, difficulty urinating, dysuria, flank pain, frequency, hematuria, menstrual problem, pelvic pain, urgency, vaginal bleeding, vaginal discharge and vaginal pain.   Skin:  Negative for rash.   Neurological:  Positive for numbness. Negative for dizziness, tremors, seizures, syncope, facial asymmetry, speech difficulty,  weakness, light-headedness and headaches.   Psychiatric/Behavioral:  Negative for agitation, behavioral problems, confusion, decreased concentration, dysphoric mood, hallucinations, self-injury, sleep disturbance and suicidal ideas. The patient is not nervous/anxious and is not hyperactive.    All other systems reviewed and are negative.        Current Outpatient Medications   Medication Sig Dispense Refill    montelukast 10 MG Oral Tab Take 1 tablet (10 mg total) by mouth nightly. 30 tablet 0    levocetirizine 5 MG Oral Tab Take 1 tablet (5 mg total) by mouth in the morning and 1 tablet (5 mg total) before bedtime. 180 tablet 2    Mometasone Furoate (NASONEX) 50 MCG/ACT Nasal Suspension 2 sprays by Nasal route daily. 3 Inhaler 2    Multiple Vitamins-Minerals (MULTIVITAMIN ADULT OR) Take by mouth daily.       Allergies:  Allergies   Allergen Reactions    Pcn [Penicillins]     Sulfa Antibiotics ANXIETY       HISTORY:  Past Medical History:    Asthma (HCC)    Dysplasia of cervix    Mononucleosis    SEASONAL ALLERGIES    Seizure disorder (HCC)    infantile treated to age 3    Thyroid nodule    teenager, suppressive treatment    Vocal cord cyst    [place] teeth extracted      Past Surgical History:   Procedure Laterality Date    Carpal tunnel release      Colposcopy,bx cervix/endocerv curr      Other surgical history      Vocal cord cyst removal    Tonsillectomy  1988      Family History   Problem Relation Age of Onset    Allergies Mother     Other (osteoporosis.) Mother     Cancer Father 65        prostate ca, prostatectomy    Hypertension Father     Breast Cancer Maternal Grandmother 52        d.54    Stroke Maternal Grandfather     Heart Attack Maternal Grandfather     Cancer Maternal Grandfather 52        bladder ca; non-smoker; ; d.90    Stroke Paternal Grandmother     Breast Cancer Maternal Aunt 42        BRCA1/2 neg in 2001    Breast Cancer Maternal Aunt 51        others dx 60,63    Cancer Maternal Uncle 64         bladder ca; non-smoker    Breast Cancer Maternal Cousin Female 50        others dx 54, 56      Social History:   Social History     Socioeconomic History    Marital status:    Tobacco Use    Smoking status: Never    Smokeless tobacco: Never    Tobacco comments:     No household smokers.    Vaping Use    Vaping status: Never Used   Substance and Sexual Activity    Alcohol use: Not Currently     Comment: socially    Drug use: No   Other Topics Concern    Caffeine Concern Yes     Comment: Coffee, soda         Exercise level: exercises 6 times a  week (elliptical X 45 minutes  and rowing and strength training) and has been following low salt diet.  Weight has been stable.  Wt Readings from Last 3 Encounters:   04/25/24 185 lb 3.2 oz (84 kg)   11/02/23 176 lb (79.8 kg)   09/22/23 176 lb (79.8 kg)     BP Readings from Last 3 Encounters:   04/25/24 121/80   11/02/23 134/82   09/22/23 102/78       Labs:   Lab Results   Component Value Date/Time     (H) 04/25/2023 08:47 AM     04/25/2023 08:47 AM    K 4.6 04/25/2023 08:47 AM     04/25/2023 08:47 AM    CO2 24.0 04/25/2023 08:47 AM    CREATSERUM 0.84 04/25/2023 08:47 AM    CA 9.1 04/25/2023 08:47 AM    AST 13 (L) 04/25/2023 08:47 AM    ALT 23 04/25/2023 08:47 AM    TSH 3.360 08/07/2023 09:22 AM    T4F 1.1 08/07/2023 09:22 AM        Lab Results   Component Value Date/Time    CHOLEST 204 (H) 04/25/2023 08:47 AM    HDL 75 (H) 04/25/2023 08:47 AM    TRIG 82 04/25/2023 08:47 AM     (H) 04/25/2023 08:47 AM    NONHDLC 129 04/25/2023 08:47 AM          PHYSICAL EXAM:   /80 (BP Location: Right arm, Patient Position: Sitting, Cuff Size: large)   Pulse 93   Temp 97.9 °F (36.6 °C) (Oral)   Ht 5' 4\" (1.626 m)   Wt 185 lb 3.2 oz (84 kg)   SpO2 100%   BMI 31.79 kg/m²   BP Readings from Last 3 Encounters:   04/25/24 121/80   11/02/23 134/82   09/22/23 102/78     Wt Readings from Last 3 Encounters:   04/25/24 185 lb 3.2 oz (84 kg)   11/02/23  176 lb (79.8 kg)   09/22/23 176 lb (79.8 kg)       Physical Exam  Vitals and nursing note reviewed.   Constitutional:       General: She is not in acute distress.     Appearance: Normal appearance. She is well-developed and well-groomed. She is not ill-appearing, toxic-appearing or diaphoretic.      Interventions: She is not intubated.  HENT:      Head: Normocephalic and atraumatic.      Right Ear: Tympanic membrane, ear canal and external ear normal. No decreased hearing noted. No laceration, drainage, swelling or tenderness. No middle ear effusion. There is no impacted cerumen. No foreign body. No mastoid tenderness. No PE tube. No hemotympanum. Tympanic membrane is not injected, scarred, perforated, erythematous, retracted or bulging. Tympanic membrane has normal mobility.      Left Ear: Tympanic membrane, ear canal and external ear normal. No decreased hearing noted. No laceration, drainage, swelling or tenderness.  No middle ear effusion. There is no impacted cerumen. No foreign body. No mastoid tenderness. No PE tube. No hemotympanum. Tympanic membrane is not injected, scarred, perforated, erythematous, retracted or bulging. Tympanic membrane has normal mobility.      Nose:      Right Sinus: No maxillary sinus tenderness or frontal sinus tenderness.      Left Sinus: No maxillary sinus tenderness or frontal sinus tenderness.      Mouth/Throat:      Comments: Patient wearing mask.   Did not have patient remove mask due to current Covid virus situation.  Eyes:      General: Lids are normal. No scleral icterus.        Right eye: No foreign body, discharge or hordeolum.         Left eye: No foreign body, discharge or hordeolum.      Extraocular Movements: Extraocular movements intact.      Right eye: Normal extraocular motion and no nystagmus.      Left eye: Normal extraocular motion and no nystagmus.      Conjunctiva/sclera: Conjunctivae normal.      Right eye: Right conjunctiva is not injected. No chemosis,  exudate or hemorrhage.     Left eye: Left conjunctiva is not injected. No chemosis, exudate or hemorrhage.     Pupils: Pupils are equal, round, and reactive to light.   Neck:      Thyroid: No thyroid mass, thyromegaly or thyroid tenderness.      Vascular: Normal carotid pulses. No carotid bruit, hepatojugular reflux or JVD.      Trachea: Trachea and phonation normal. No tracheal tenderness, tracheostomy, abnormal tracheal secretions or tracheal deviation.   Cardiovascular:      Rate and Rhythm: Normal rate and regular rhythm.      Pulses: Normal pulses.           Carotid pulses are 2+ on the right side and 2+ on the left side.       Radial pulses are 2+ on the right side and 2+ on the left side.        Dorsalis pedis pulses are 2+ on the right side and 2+ on the left side.        Posterior tibial pulses are 2+ on the right side and 2+ on the left side.      Heart sounds: Normal heart sounds, S1 normal and S2 normal.   Pulmonary:      Effort: Pulmonary effort is normal. No tachypnea, bradypnea, accessory muscle usage, prolonged expiration, respiratory distress or retractions. She is not intubated.      Breath sounds: Normal breath sounds and air entry. No stridor, decreased air movement or transmitted upper airway sounds. No decreased breath sounds, wheezing, rhonchi or rales.   Chest:      Chest wall: No tenderness.   Breasts:     Breasts are symmetrical.      Right: No swelling, bleeding, inverted nipple, mass, nipple discharge, skin change or tenderness.      Left: No swelling, bleeding, inverted nipple, mass, nipple discharge, skin change or tenderness.   Abdominal:      General: Bowel sounds are normal. There is no distension.      Palpations: Abdomen is soft. Abdomen is not rigid. There is no fluid wave, hepatomegaly, splenomegaly or mass.      Tenderness: There is no abdominal tenderness. There is no right CVA tenderness, left CVA tenderness, guarding or rebound.   Genitourinary:     Exam position: Supine.       Rectum: Internal hemorrhoid present. No mass, tenderness, anal fissure (Small internal hemorrhoid at 6 6 o'clock position.) or external hemorrhoid. Normal anal tone.   Musculoskeletal:      Cervical back: Neck supple. No tenderness.      Right lower leg: No edema.      Left lower leg: No edema.      Right ankle: No swelling, deformity, ecchymosis or lacerations. No tenderness. Normal range of motion. Anterior drawer test negative. Normal pulse.      Right Achilles Tendon: No tenderness or defects. Crane's test negative.      Right foot: Normal range of motion and normal capillary refill. Deformity and bunion present. No swelling, foot drop, laceration, tenderness, bony tenderness or crepitus. Normal pulse.   Lymphadenopathy:      Head:      Right side of head: No submental, submandibular, preauricular, posterior auricular or occipital adenopathy.      Left side of head: No submental, submandibular, preauricular, posterior auricular or occipital adenopathy.      Cervical: No cervical adenopathy.      Right cervical: No superficial, deep or posterior cervical adenopathy.     Left cervical: No superficial, deep or posterior cervical adenopathy.      Upper Body:      Right upper body: No supraclavicular, axillary or pectoral adenopathy.      Left upper body: No supraclavicular, axillary or pectoral adenopathy.   Skin:     General: Skin is warm and dry.      Coloration: Skin is not pale.      Findings: No erythema or rash.   Neurological:      Mental Status: She is alert and oriented to person, place, and time.   Psychiatric:         Mood and Affect: Mood normal.         Speech: Speech normal.         Behavior: Behavior normal. Behavior is cooperative.              ASSESSMENT/PLAN:     Encounter Diagnoses   Name Primary?    Vitamin D deficiency Stable.    Yes    Family history of malignant neoplasm of breast       Mild intermittent asthma without complication (HCC) Stable.        Vaccine counseling Up to date.         Mixed hyperlipidemia Check blood.        Thyroid nodule Stable.        Physical exam, annual Check urine.        Breast cancer screening by mammogram Normal mammogram. Continue self breast exam every month.         Hyperglycemia Check blood.        Pap smear for cervical cancer screening Up to date.        Colon cancer screening FU GI for colonoscopy.       R toe numbness. Fu podiatry.     Skin exam. Fu derm.     Anal pruritus. Sitz baths.  Prep H.     Orders Placed This Encounter   Procedures    Lipid Panel    CBC With Differential With Platelet    Comp Metabolic Panel (14)    Hemoglobin A1C    URINALYSIS, AUTO, W/O SCOPE    TSH W Reflex To Free T4    Free T4, (Free Thyroxine)    Vitamin D       Meds This Visit:  Requested Prescriptions      No prescriptions requested or ordered in this encounter       Imaging & Referrals:  GASTRO - INTERNAL  DERM - INTERNAL  OP REFERRAL TO [facility] TELEPHONE COLON SCREEN      RTC 1 yr. for physical.   Blood done to be after 8-7-24.

## 2024-05-02 ENCOUNTER — NURSE ONLY (OUTPATIENT)
Facility: CLINIC | Age: 44
End: 2024-05-02

## 2024-05-02 DIAGNOSIS — Z12.11 SCREEN FOR COLON CANCER: Primary | ICD-10-CM

## 2024-05-02 NOTE — PROGRESS NOTES
[facility] Colon Screening Orders    Please schedule: Colonoscopy [id number] with MAC    Please send split dose Golytely bowel prep     Diagnosis: Colon Screening Z12.11    Medication adjustments: Hold vitamins for 7 days prior to procedure    >>>Please inform patient if new medications are started after scheduling procedure they need to call clinic to notify us.

## 2024-05-02 NOTE — PROGRESS NOTES
Called patient for scheduled telephone colon screening.   Medications, pharmacy, and allergies verified with the patient.     Age 45-66 y/o (Y/N): Yes  66-76 y/o ? Route to GI provider per rotation schedule   › GI MD preference: None  › Insurance:  BCBS PPO  › Last PCP Visit: 4/25/2024  IF NO PCP within [facility] ? Not TCS/FIT Eligible   › Last CBC drawn: order placed by PCP - not completed  › Date of positive FIT test: N/A  › H/W/BMI: 5'4 / 185 / 31.77    Special comments/notes: None    Telephone colon screening Questionnaires:  Yes No   Are you currently experiencing any new GI symptoms? [] [x]   If yes, symptom details:     Rectal Bleeding with or without bowel movements: [] [x]   Black stool: [] [x]   Dysphagia &Food feeling/getting stuck: [] [x]   Intractable Vomiting: [] [x]   Unexplained weight loss: [] [x]   First colonoscopy? [x] []   Family history of colon cancer? Half brother - \"significant polyps\" - no cancer  [] [x]   Any issues with anesthesia? [] [x]   If yes, explain:      Personal history of Resp. Issues/Oxygen Use/JACY/COPD? asthma [] [x]   If yes, CPAP/BiPAP? [] [x]   History of devices Pacemaker/Defibrillator/Stents? [] [x]   History of Cardiac/CVA issues/(MI/Stroke):  [] [x]     Medication usage:  Yes  No   Anticoagulants:  Anticoagulant (Except Aspirin) ? Route to RN staff to obtain ordering provider orders [] [x]   Diabetic Meds:   PO DM Meds ? hold day prior and day of procedure  Insulin ? Route to RN clinical staff to obtain provider orders  [] [x]   Weight loss meds (Phentermine/Vyvanse/Saxsenda/etc): [] [x]   Iron/Herbal/Multivitamin Supplement (RX/OTC): [x] []   Usage of marijuana, CBD &/or vape products: [] [x]

## 2024-05-02 NOTE — PROGRESS NOTES
Scheduled for:  Colonoscopy [id number]  Provider Name:  Dr. Baxter  Date:  9/13/2024  [facility]  Sedation:  MAC  Time:  8:15 am, arrival 7:15 am  Prep:  Golytely  Meds/Allergies Reconciled?:  Yes  Diagnosis with codes:  Screening for colon cancer Z12.11  Was patient informed to call insurance with codes (Y/N):  Yes  Referral sent?:  Referral was sent at the time of electronic surgical scheduling.  EM or [facility] notified?:  I sent an electronic request to Endo Scheduling and received a confirmation today.    Medication Orders:  Patient is aware to NOT take iron pills, herbal meds and diet supplements for 7 days before exam. Also to NOT take any form of alcohol, recreational drugs and any forms of ED meds 24-72 hours before exam.   Misc Orders:  Authorized to order bowel prep prescription per criteria from GI bowel prep protocol met.     Further instructions given by staff:  Prep instructions were sent to patient via [employer], patient verbalized understanding.

## 2024-05-04 RX ORDER — MONTELUKAST SODIUM 10 MG/1
10 TABLET ORAL NIGHTLY
Qty: 90 TABLET | Refills: 0 | Status: SHIPPED | OUTPATIENT
Start: 2024-05-04

## 2024-05-04 NOTE — TELEPHONE ENCOUNTER
Received refill request for Singulair 10 mg- 1 tablet by mouth daily.     Last office visit was 3/9/23 for chronic urticaria, hives, and asthma.    Patient has an appointment scheduled for 5/18/24.    Refill meets protocol- refilled.

## 2024-05-18 ENCOUNTER — LAB ENCOUNTER (OUTPATIENT)
Dept: LAB | Age: 44
End: 2024-05-18
Attending: ALLERGY & IMMUNOLOGY

## 2024-05-18 ENCOUNTER — OFFICE VISIT (OUTPATIENT)
Dept: ALLERGY | Facility: CLINIC | Age: 44
End: 2024-05-18

## 2024-05-18 VITALS
OXYGEN SATURATION: 98 % | HEART RATE: 77 BPM | RESPIRATION RATE: 18 BRPM | SYSTOLIC BLOOD PRESSURE: 128 MMHG | DIASTOLIC BLOOD PRESSURE: 89 MMHG

## 2024-05-18 DIAGNOSIS — J30.2 SEASONAL AND PERENNIAL ALLERGIC RHINOCONJUNCTIVITIS: ICD-10-CM

## 2024-05-18 DIAGNOSIS — L50.1 CHRONIC IDIOPATHIC URTICARIA: Primary | ICD-10-CM

## 2024-05-18 DIAGNOSIS — Z23 NEED FOR COVID-19 VACCINE: ICD-10-CM

## 2024-05-18 DIAGNOSIS — J45.20 RAD (REACTIVE AIRWAY DISEASE), MILD INTERMITTENT, UNCOMPLICATED (HCC): ICD-10-CM

## 2024-05-18 DIAGNOSIS — J30.89 SEASONAL AND PERENNIAL ALLERGIC RHINOCONJUNCTIVITIS: ICD-10-CM

## 2024-05-18 DIAGNOSIS — Z88.0 H/O PENICILLIN-TYPE ANTIBIOTIC ALLERGY: ICD-10-CM

## 2024-05-18 DIAGNOSIS — H10.10 SEASONAL AND PERENNIAL ALLERGIC RHINOCONJUNCTIVITIS: ICD-10-CM

## 2024-05-18 DIAGNOSIS — L50.5 CHOLINERGIC URTICARIA: ICD-10-CM

## 2024-05-18 DIAGNOSIS — Z23 FLU VACCINE NEED: ICD-10-CM

## 2024-05-18 PROCEDURE — 36415 COLL VENOUS BLD VENIPUNCTURE: CPT

## 2024-05-18 PROCEDURE — 86003 ALLG SPEC IGE CRUDE XTRC EA: CPT

## 2024-05-18 PROCEDURE — 3079F DIAST BP 80-89 MM HG: CPT | Performed by: ALLERGY & IMMUNOLOGY

## 2024-05-18 PROCEDURE — 99214 OFFICE O/P EST MOD 30 MIN: CPT | Performed by: ALLERGY & IMMUNOLOGY

## 2024-05-18 PROCEDURE — 3074F SYST BP LT 130 MM HG: CPT | Performed by: ALLERGY & IMMUNOLOGY

## 2024-05-18 RX ORDER — ALBUTEROL SULFATE 90 UG/1
2 AEROSOL, METERED RESPIRATORY (INHALATION) EVERY 6 HOURS PRN
Qty: 1 EACH | Refills: 0 | Status: SHIPPED | OUTPATIENT
Start: 2024-05-18

## 2024-05-18 RX ORDER — MONTELUKAST SODIUM 10 MG/1
10 TABLET ORAL NIGHTLY
Qty: 90 TABLET | Refills: 2 | Status: SHIPPED | OUTPATIENT
Start: 2024-05-18

## 2024-05-18 RX ORDER — LEVOCETIRIZINE DIHYDROCHLORIDE 5 MG/1
5 TABLET, FILM COATED ORAL EVERY EVENING
Qty: 90 TABLET | Refills: 2 | Status: SHIPPED | OUTPATIENT
Start: 2024-05-18

## 2024-05-18 NOTE — PROGRESS NOTES
Melissa Anderson is a 44 year old female.    HPI:     Chief Complaint   Patient presents with    Hives     Pt here for routine follow up. Reports hives are very well controlled. Pt here for refills of Xyzal and Singulair.       Patient is a 44-year-old female who presents for follow-up with a chief complaint of allergies and hives  Patient last seen me in March 2023  Patient with a history of chronic idiopathic urticaria with dermatographia, allergic rhinitis asthma and penicillin allergy      Prior serum IgE testing to environmental allergens in the past was negative  Medications listed include Singulair Xyzal and Nasonex    Today patient reports    Chronic idiopathic urticaria with dermatographia  ED visits or prednisone in the interim.: denies  Xyzal 1x/day , singulair   Prior Xolair:denies   No prior xolair   Worse with heat     Asthma  Active or persistent symptoms more than 2 days/week denies  ED visits or prednisone in the interim: denies   Active meds:  alb prn   Nonsmoker     Ar:  Active or persistent symptoms: denies  Some itching   Better this spring   Active meds: xyzal singulair   pets  1 dog           HISTORY:  Past Medical History:    Asthma (HCC)    Dysplasia of cervix    Mononucleosis    SEASONAL ALLERGIES    Seizure disorder (HCC)    infantile treated to age 3    Thyroid nodule    teenager, suppressive treatment    Vocal cord cyst    [place] teeth extracted      Past Surgical History:   Procedure Laterality Date    Carpal tunnel release      Colposcopy,bx cervix/endocerv curr      Other surgical history      Vocal cord cyst removal    Tonsillectomy  1988      Family History   Problem Relation Age of Onset    Allergies Mother     Other (osteoporosis.) Mother     Cancer Father 65        prostate ca, prostatectomy    Hypertension Father     Breast Cancer Maternal Grandmother 52        d.54    Stroke Maternal Grandfather     Heart Attack Maternal Grandfather     Cancer Maternal Grandfather 52         bladder ca; non-smoker; ; d.90    Stroke Paternal Grandmother     Breast Cancer Maternal Aunt 42        BRCA1/2 neg in 2001    Breast Cancer Maternal Aunt 51        others dx 60,63    Cancer Maternal Uncle 64        bladder ca; non-smoker    Breast Cancer Maternal Cousin Female 50        others dx 54, 56      Social History:   Social History     Socioeconomic History    Marital status:    Tobacco Use    Smoking status: Never    Smokeless tobacco: Never    Tobacco comments:     No household smokers.    Vaping Use    Vaping status: Never Used   Substance and Sexual Activity    Alcohol use: Not Currently     Comment: socially    Drug use: No   Other Topics Concern    Caffeine Concern Yes     Comment: Coffee, soda         Medications (Active prior to today's visit):  Current Outpatient Medications   Medication Sig Dispense Refill    albuterol (PROAIR HFA) 108 (90 Base) MCG/ACT Inhalation Aero Soln Inhale 2 puffs into the lungs every 6 (six) hours as needed for Wheezing. 1 each 0    montelukast 10 MG Oral Tab Take 1 tablet (10 mg total) by mouth nightly. 90 tablet 2    levocetirizine 5 MG Oral Tab Take 1 tablet (5 mg total) by mouth every evening. 90 tablet 2    Mometasone Furoate (NASONEX) 50 MCG/ACT Nasal Suspension 2 sprays by Nasal route daily. 3 Inhaler 2    Multiple Vitamins-Minerals (MULTIVITAMIN ADULT OR) Take by mouth daily.         Allergies:  Allergies   Allergen Reactions    Pcn [Penicillins]     Sulfa Antibiotics ANXIETY         ROS:   Allergic/Immuno:  See hpi  Cardiovascular:  Negative for irregular heartbeat/palpitations, chest pain, edema  Constitutional:  Negative night sweats,weight loss, irritability and lethargy  ENMT:  Negative for ear drainage, hearing loss and nasal drainage  Eyes:  Negative for eye discharge and vision loss  Gastrointestinal:  Negative for abdominal pain, diarrhea and vomiting  Integumentary:  Negative for pruritus and rash  Respiratory:  Negative for cough, dyspnea  and wheezing    PHYSICAL EXAM:   Constitutional: responsive, no acute distress noted  Head/Face: NC/Atraumatic  Eyes/Vision: conjunctiva and lids are normal extraocular motion is intact   Ears/Audiometry: tympanic membranes are normal bilaterally hearing is grossly intact  Nose/Mouth/Throat: nose and throat are clear mucous membranes are moist   Neck/Thyroid: neck is supple without adenopathy  Lymphatic: no abnormal cervical, supraclavicular or axillary adenopathy is noted  Respiratory: normal to inspection lungs are clear to auscultation bilaterally normal respiratory effort   Cardiovascular: regular rate and rhythm no murmurs, gallups, or rubs  Abdomen: soft non-tender non-distended  Skin/Hair: no unusual rashes present   Extremities: no edema, cyanosis, or clubbing     ASSESSMENT/PLAN:   Assessment   Encounter Diagnoses   Name Primary?    Chronic idiopathic urticaria Yes    Cholinergic urticaria     RAD (reactive airway disease), mild intermittent, uncomplicated (HCC)     Seasonal and perennial allergic rhinoconjunctivitis     H/O penicillin-type antibiotic allergy     Need for COVID-19 vaccine     Flu vaccine need      #1 chronic idiopathic urticaria with cholinergic component  Xyzal 5 mg once a day up to 4 times per day if needed  Consider Xolair if refractory  Currently using Xyzal once a day with good control.      #2 reactive airway disease  Mild intermittent by history.  Albuterol as needed.  No ED visits or prednisone in the interim  Albuterol refilled    #3 allergic rhinitis  Reviewed avoidance measures and potential treatment option immunotherapy        #4 penicillin allergy  Reviewed potential serum IgE testing to penicillin to further evaluate for an IgE mediated process  Check serum IgE to penicillin will call with results.  May consider oral challenge if testing is negative      #5 flu vaccine in the fall recommended    #6 COVID vaccines reviewed.  5 doses today last in April 2023  Reviewed most  recent booster available since September 2023.  Reviewed I do not have the vaccine my office.           Orders This Visit:  Orders Placed This Encounter   Procedures    Penicillin V    Penicillin G       Meds This Visit:  Requested Prescriptions     Signed Prescriptions Disp Refills    albuterol (PROAIR HFA) 108 (90 Base) MCG/ACT Inhalation Aero Soln 1 each 0     Sig: Inhale 2 puffs into the lungs every 6 (six) hours as needed for Wheezing.    montelukast 10 MG Oral Tab 90 tablet 2     Sig: Take 1 tablet (10 mg total) by mouth nightly.    levocetirizine 5 MG Oral Tab 90 tablet 2     Sig: Take 1 tablet (5 mg total) by mouth every evening.       Imaging & Referrals:  None     5/18/2024  Rashad Balderas MD    If medication samples were provided today, they were provided solely for patient education and training related to self administration of these medications.  Teaching, instruction and sample was provided to the patient by myself.  Teaching included  a review of potential adverse side effects as well as potential efficacy.  Patient's questions were answered in regards to medication administration and dosing and potential side effects. Teaching was provided via the teach back method

## 2024-05-24 LAB
C001-IGE PENICILLOYL G: <0.1 KU/L
C002-IGE PENICILLOYL V: <0.1 KU/L

## 2024-05-28 ENCOUNTER — TELEPHONE (OUTPATIENT)
Dept: ALLERGY | Facility: CLINIC | Age: 44
End: 2024-05-28

## 2024-05-28 NOTE — TELEPHONE ENCOUNTER
Left message to call back.     Please give patient Dr. Balderas results and recommendations when she returns call.             Rashad Balderas MD  5/28/2024  7:04 AM CDT Back to Top      Please contact patient with negative penicillin testing.  Recommend oral challenge to further evaluate     See 5/18/2024 Lab Encounter   Well-Child Exam: Age 4 year old     Kentrell Lozoya is a 4 year old male and is here for a well-child exam. He is here with his mother Char.    Parental Concerns None    Water Source:  city    Dentist Yes    Milk 2%       Amount 1 cup    Second Hand Smoke Exposure No    Pets Yes    Cholesterol/Heart Risk  Low Risk    Lead Risk Low Risk  House <1960 and peeling paint No  House <1960 and renovations No  Hobbies with Pb exposure No  Sibs or Playmates with Pb poisoning No  Live near a Pb smelter or battery recycling center No    TB Risk: Low Risk  Child Born Outside of [place] No  Contact with anyone with TB No  Contact with anyone with positive PPD No    Screening/Safety:  Child rides in the back row of the car.   Child rides in a booster carseat.  Any patient education given No.     Uses helmet for bike riding: Yes    Parental Hearing Concerns No    Parental Vision Concerns No    Vision Vision Spot- All Measurements in Range    OD  -SE 0.00         -DS +0.25         -DC -0.25         -Axis @3  OS  -SE +0.25         -DS +0.25         -DC 0.00         -[place] @    Hearing Pass    History:    ***    · Developmental/Behavior concerns: None  · Grade: Pre- (4K)    School: [employer]  · Activities: going to start wrestling     · Nutrition: does eat a variety of foods    · Sleep: 800pm-700am; no concerns      The patient's history and problem list were reviewed and updated as follows:  Patient Active Problem List    Diagnosis Date Noted   • Mild eczema      Priority: Low       Social History:  Lives with mom, dad, brother & sister    ALLERGIES:  No Known Allergies    MEDICATIONS:  Current Outpatient Medications   Medication Sig   • albuterol (VENTOLIN) (2.5 MG/3ML) 0.083% nebulizer solution Take 3 mLs by nebulization every 4 hours as needed for Wheezing.   • hydroCORTisone (CORTIZONE) 2.5 % cream Apply thin layer to eczema on body twice daily as needed for redness / itching.     No current facility-administered  medications for this visit.        REVIEW OF SYSTEMS: ***  · General:  No fever, fatigue.   · Eyes:  No vision problems, eye redness, eye drainage.  · ENT:  No congestion, ear pain, snoring, sore throat, dental problems  · Respiratory:  No cough, wheeze, shortness of breath  · Cardiovascular:  No chest pain, dizziness, syncope, palpitations.   · Gastrointestinal:  No abdominal pain, vomiting, diarrhea, constipation, bloody stools.   · Skin:  No rashes, bruising, bleeding.  · Genitourinary:  No dysuria, hematuria, frequency, night symptoms.   · Musculoskeletal:  No back or extremity pain, swelling, limp.  · Endocrine: No excessive thirst or urination.   · Neurological:  No headaches, weakness.     Physical:     Vital Signs There were no vitals taken for this visit.     · General:  The patient is alert and in no distress.  Interactive.    • Head:  Normocephalic   • Eyes: Normal lids, conjunctivae and pupils. PERRL, EOMI.    • Ears: TMs (tympanic membranes) normal. Ear canals normal.  • Nose: Normal turbinates and septum.    · Oropharynx: Moist mucous membranes without erythema or exudate.  Tonsils:  ***.  · Neck: Supple. No lymphadenopathy or masses.  Full range of motion.  • Lungs: CTA (clear to auscultation), no retractions.  • Heart: Regular rate and rhythm with normal heart sounds, no murmur. Precordium normal.  • Abdomen: Soft and nontender, without mass or hepatosplenomegaly.  • Skin: Well-perfused without rash. Normal skin turgor.  • Extremities: Gross range of motion exam normal for upper and lower extremities, normal gait.  • Back: Straight. Normal forward bend test.   • Neurologic: Normal muscle tone, strength and deep tendon reflexes. Facial movements normal and symmetric.   • Genitalia: Normal male {JWB MIMI STAGES:360232}    Assessment:   · 4 year old male, well child      Plan:   · Handout for age was provided ([employer]).  · Anticipatory guidance (discussed or on handout - nutrition, activity,  limiting screen time, safety, behavior, oral health, dental visit, fluoride, accident prevention).   · Immunizations: {[facility] IMMUNIZATION PLAN:739671}  · ***  · Follow up in 1 year for well-, sooner if concerns.     iTna Navarrete MD    Health Maintenance Due   Topic Date Due   • COVID-19 Vaccine (1) Never done   • Influenza Vaccine (1) 09/01/2023   • Annual Physical (ages 3-18)  11/28/2023       Patient is due for topics as listed above but is not proceeding with Immunization(s) COVID-19 and Influenza at this time.

## 2024-05-29 NOTE — TELEPHONE ENCOUNTER
Patient returned call and confirmed her name and .    Oral challenge scheduled for Penicillin testing--she preferred to have testing done in the fall due to having to be off of antihistamines for 5 days prior.  Pt is aware to contact our office a week prior for a refill of Penicillin to bring to office.  She is aware to eat breakfast prior to testing.    Pt is aware to avoid penicillin until her follow up.

## 2024-06-19 ENCOUNTER — LAB ENCOUNTER (OUTPATIENT)
Dept: LAB | Age: 44
End: 2024-06-19
Attending: INTERNAL MEDICINE

## 2024-06-19 ENCOUNTER — OFFICE VISIT (OUTPATIENT)
Dept: INTERNAL MEDICINE CLINIC | Facility: CLINIC | Age: 44
End: 2024-06-19

## 2024-06-19 VITALS
HEIGHT: 64 IN | SYSTOLIC BLOOD PRESSURE: 116 MMHG | BODY MASS INDEX: 31.76 KG/M2 | DIASTOLIC BLOOD PRESSURE: 82 MMHG | WEIGHT: 186 LBS | OXYGEN SATURATION: 98 % | TEMPERATURE: 98 F | HEART RATE: 79 BPM

## 2024-06-19 DIAGNOSIS — Z11.3 SCREENING FOR STD (SEXUALLY TRANSMITTED DISEASE): ICD-10-CM

## 2024-06-19 DIAGNOSIS — Z02.1 PRE-EMPLOYMENT EXAMINATION: Primary | ICD-10-CM

## 2024-06-19 PROCEDURE — 3079F DIAST BP 80-89 MM HG: CPT | Performed by: INTERNAL MEDICINE

## 2024-06-19 PROCEDURE — 99396 PREV VISIT EST AGE 40-64: CPT | Performed by: INTERNAL MEDICINE

## 2024-06-19 PROCEDURE — 87389 HIV-1 AG W/HIV-1&-2 AB AG IA: CPT

## 2024-06-19 PROCEDURE — 3074F SYST BP LT 130 MM HG: CPT | Performed by: INTERNAL MEDICINE

## 2024-06-19 PROCEDURE — 3008F BODY MASS INDEX DOCD: CPT | Performed by: INTERNAL MEDICINE

## 2024-06-19 PROCEDURE — 36415 COLL VENOUS BLD VENIPUNCTURE: CPT

## 2024-06-19 NOTE — PROGRESS NOTES
Subjective:     Patient ID: Melissa Anderson is a 44 year old female.    HPI    History/Other: She is here today needs forms to be completed for her citizenship.  She denies any current complaint.  She is not taking any medication.  She has history of allergies well-controlled  Also needs HIV test  Review of Systems   Constitutional: Negative.    HENT: Negative.     Respiratory: Negative.     Cardiovascular: Negative.    Gastrointestinal: Negative.    Genitourinary: Negative.    Musculoskeletal: Negative.    Skin: Negative.    Neurological: Negative.    Hematological: Negative.    Psychiatric/Behavioral: Negative.       Current Outpatient Medications   Medication Sig Dispense Refill    albuterol (PROAIR HFA) 108 (90 Base) MCG/ACT Inhalation Aero Soln Inhale 2 puffs into the lungs every 6 (six) hours as needed for Wheezing. 1 each 0    montelukast 10 MG Oral Tab Take 1 tablet (10 mg total) by mouth nightly. 90 tablet 2    levocetirizine 5 MG Oral Tab Take 1 tablet (5 mg total) by mouth every evening. 90 tablet 2    Mometasone Furoate (NASONEX) 50 MCG/ACT Nasal Suspension 2 sprays by Nasal route daily. 3 Inhaler 2    Multiple Vitamins-Minerals (MULTIVITAMIN ADULT OR) Take by mouth daily.       Allergies:  Allergies   Allergen Reactions    Pcn [Penicillins]     Sulfa Antibiotics ANXIETY       Past Medical History:    Asthma (HCC)    Dysplasia of cervix    Mononucleosis    SEASONAL ALLERGIES    Seizure disorder (HCC)    infantile treated to age 3    Thyroid nodule    teenager, suppressive treatment    Vocal cord cyst    [place] teeth extracted      Past Surgical History:   Procedure Laterality Date    Carpal tunnel release      Colposcopy,bx cervix/endocerv curr      Other surgical history      Vocal cord cyst removal    Tonsillectomy  1988      Family History   Problem Relation Age of Onset    Allergies Mother     Other (osteoporosis.) Mother     Cancer Father 65        prostate ca, prostatectomy    Hypertension  Father     Breast Cancer Maternal Grandmother 52        d.54    Stroke Maternal Grandfather     Heart Attack Maternal Grandfather     Cancer Maternal Grandfather 52        bladder ca; non-smoker; ; d.90    Stroke Paternal Grandmother     Breast Cancer Maternal Aunt 42        BRCA1/2 neg in 2001    Breast Cancer Maternal Aunt 51        others dx 60,63    Cancer Maternal Uncle 64        bladder ca; non-smoker    Breast Cancer Maternal Cousin Female 50        others dx 54, 56      Social History:   Social History     Socioeconomic History    Marital status:    Tobacco Use    Smoking status: Never    Smokeless tobacco: Never    Tobacco comments:     No household smokers.    Vaping Use    Vaping status: Never Used   Substance and Sexual Activity    Alcohol use: Not Currently     Comment: socially    Drug use: No   Other Topics Concern    Caffeine Concern Yes     Comment: Coffee, soda         Objective:   Physical Exam  Vitals and nursing note reviewed.   Constitutional:       Appearance: Normal appearance.   HENT:      Head: Normocephalic and atraumatic.   Cardiovascular:      Rate and Rhythm: Normal rate and regular rhythm.      Pulses: Normal pulses.      Heart sounds: Normal heart sounds.   Pulmonary:      Effort: Pulmonary effort is normal.      Breath sounds: Normal breath sounds.   Abdominal:      General: Bowel sounds are normal.      Palpations: Abdomen is soft.   Musculoskeletal:         General: Normal range of motion.      Cervical back: Normal range of motion and neck supple.   Skin:     General: Skin is warm.   Neurological:      Mental Status: She is alert. Mental status is at baseline.         Assessment & Plan:   Pre employement physical- form filled out    Orders Placed This Encounter   Procedures    HIV AG AB Combo [E]       Meds This Visit:  Requested Prescriptions      No prescriptions requested or ordered in this encounter       Imaging & Referrals:  None

## 2024-07-01 ENCOUNTER — OFFICE VISIT (OUTPATIENT)
Dept: FAMILY MEDICINE CLINIC | Facility: CLINIC | Age: 44
End: 2024-07-01
Payer: COMMERCIAL

## 2024-07-01 VITALS
OXYGEN SATURATION: 99 % | TEMPERATURE: 98 F | HEIGHT: 64 IN | RESPIRATION RATE: 16 BRPM | DIASTOLIC BLOOD PRESSURE: 88 MMHG | SYSTOLIC BLOOD PRESSURE: 132 MMHG | HEART RATE: 74 BPM | WEIGHT: 188.81 LBS | BODY MASS INDEX: 32.23 KG/M2

## 2024-07-01 DIAGNOSIS — R05.8 POST-VIRAL COUGH SYNDROME: ICD-10-CM

## 2024-07-01 DIAGNOSIS — J01.10 ACUTE FRONTAL SINUSITIS, RECURRENCE NOT SPECIFIED: Primary | ICD-10-CM

## 2024-07-01 PROCEDURE — 3075F SYST BP GE 130 - 139MM HG: CPT | Performed by: NURSE PRACTITIONER

## 2024-07-01 PROCEDURE — 3008F BODY MASS INDEX DOCD: CPT | Performed by: NURSE PRACTITIONER

## 2024-07-01 PROCEDURE — 99213 OFFICE O/P EST LOW 20 MIN: CPT | Performed by: NURSE PRACTITIONER

## 2024-07-01 PROCEDURE — 3079F DIAST BP 80-89 MM HG: CPT | Performed by: NURSE PRACTITIONER

## 2024-07-01 RX ORDER — DOXYCYCLINE HYCLATE 100 MG
100 TABLET ORAL 2 TIMES DAILY
Qty: 14 TABLET | Refills: 0 | Status: SHIPPED | OUTPATIENT
Start: 2024-07-01 | End: 2024-07-08

## 2024-07-01 RX ORDER — BENZONATATE 200 MG/1
200 CAPSULE ORAL 3 TIMES DAILY PRN
Qty: 21 CAPSULE | Refills: 0 | Status: SHIPPED | OUTPATIENT
Start: 2024-07-01

## 2024-07-01 RX ORDER — FLUTICASONE PROPIONATE AND SALMETEROL XINAFOATE 115; 21 UG/1; UG/1
1 AEROSOL, METERED RESPIRATORY (INHALATION) 2 TIMES DAILY
Qty: 8 G | Refills: 0 | Status: SHIPPED | OUTPATIENT
Start: 2024-07-01

## 2024-07-01 NOTE — PROGRESS NOTES
Subjective:   Patient ID: Melissa Anderson is a 44 year old female.    Patient is a 44 year old female who presents today with complaints of a dry cough, nasal congestion and occasional body aches x 4 weeks. Tested positive for COVID 4 weeks ago. Tested negative 3 weeks ago. Symptoms seemed to be improving. Worsened the past 2 days. Now having sinus pressure, ear popping/pressure, pnd. Denies fevers, chills, runny nose, SOB, chest pain, wheezing, n/v/d or abdominal pain. Tolerating PO well at home. Attempted treatment prior to arrival = sudafed, motrin, OTC cough suppressants without much relief. Takes daily antihistamine, nasal spray and montelukast for allergies. + hx asthma and RAD.        History/Other:   Review of Systems   Constitutional:  Negative for appetite change, chills and fever.   HENT:  Positive for congestion, postnasal drip, sinus pressure and sinus pain. Negative for ear pain (popping/pressure) and rhinorrhea.    Respiratory:  Positive for cough. Negative for shortness of breath and wheezing.    Cardiovascular:  Negative for chest pain.   Gastrointestinal:  Negative for abdominal pain, diarrhea, nausea and vomiting.   Musculoskeletal:  Positive for myalgias.     Current Outpatient Medications   Medication Sig Dispense Refill    benzonatate 200 MG Oral Cap Take 1 capsule (200 mg total) by mouth 3 (three) times daily as needed for cough. 21 capsule 0    Doxycycline Hyclate 100 MG Oral Tab Take 1 tablet (100 mg total) by mouth 2 (two) times daily for 7 days. 14 tablet 0    fluticasone-salmeterol 115-21 MCG/ACT Inhalation Aerosol Inhale 1 puff into the lungs 2 (two) times daily. 8 g 0    montelukast 10 MG Oral Tab Take 1 tablet (10 mg total) by mouth nightly. 90 tablet 2    levocetirizine 5 MG Oral Tab Take 1 tablet (5 mg total) by mouth every evening. 90 tablet 2    Mometasone Furoate (NASONEX) 50 MCG/ACT Nasal Suspension 2 sprays by Nasal route daily. 3 Inhaler 2    Multiple Vitamins-Minerals  (MULTIVITAMIN ADULT OR) Take by mouth daily.      albuterol (PROAIR HFA) 108 (90 Base) MCG/ACT Inhalation Aero Soln Inhale 2 puffs into the lungs every 6 (six) hours as needed for Wheezing. (Patient not taking: Reported on 7/1/2024) 1 each 0     Allergies:  Allergies   Allergen Reactions    Pcn [Penicillins]     Sulfa Antibiotics ANXIETY     /88   Pulse 74   Temp 98.4 °F (36.9 °C)   Resp 16   Ht 5' 4\" (1.626 m)   Wt 188 lb 12.8 oz (85.6 kg)   LMP 06/23/2024 (Exact Date)   SpO2 99%   BMI 32.41 kg/m²       Objective:   Physical Exam  Vitals reviewed.   Constitutional:       General: She is awake. She is not in acute distress.     Appearance: Normal appearance. She is well-developed and well-groomed. She is not ill-appearing, toxic-appearing or diaphoretic.   HENT:      Head: Normocephalic and atraumatic.      Right Ear: Ear canal and external ear normal. A middle ear effusion is present.      Left Ear: Ear canal and external ear normal. A middle ear effusion is present.      Nose:      Right Sinus: Maxillary sinus tenderness and frontal sinus tenderness present.      Left Sinus: Maxillary sinus tenderness and frontal sinus tenderness present.      Mouth/Throat:      Lips: Pink.      Mouth: Mucous membranes are moist. No oral lesions.      Pharynx: Oropharynx is clear. Uvula midline.      Tonsils: 0 on the right. 0 on the left.   Cardiovascular:      Rate and Rhythm: Normal rate and regular rhythm.   Pulmonary:      Effort: Pulmonary effort is normal. No respiratory distress.      Breath sounds: Normal breath sounds and air entry. No decreased breath sounds, wheezing, rhonchi or rales.      Comments: Dry cough noted with every inspiration  Lymphadenopathy:      Cervical: No cervical adenopathy.   Skin:     General: Skin is warm and dry.   Neurological:      Mental Status: She is alert and oriented to person, place, and time.   Psychiatric:         Behavior: Behavior is cooperative.         Assessment &  Plan:   1. Acute frontal sinusitis, recurrence not specified    2. Post-viral cough syndrome        No orders of the defined types were placed in this encounter.      Meds This Visit:  Requested Prescriptions     Signed Prescriptions Disp Refills    benzonatate 200 MG Oral Cap 21 capsule 0     Sig: Take 1 capsule (200 mg total) by mouth 3 (three) times daily as needed for cough.    Doxycycline Hyclate 100 MG Oral Tab 14 tablet 0     Sig: Take 1 tablet (100 mg total) by mouth 2 (two) times daily for 7 days.    fluticasone-salmeterol 115-21 MCG/ACT Inhalation Aerosol 8 g 0     Sig: Inhale 1 puff into the lungs 2 (two) times daily.     Reassuring physical exam findings. Vitals WNL. No sign of RDS or dehydration at this time.  Due to HPI, duration of symptoms and clinical exam findings, will cover for sinusitis today with Doxycycline. Tessalon PRN for cough.  Patient notes a history of RAD and has used ICS inhalers in the past with similar cough/URI's. Advair sent to pharmacy.  Supportive care and return to care measures reviewed.  Patient v/u and is comfortable with this plan.    Patient Instructions   Tylenol and Motrin as needed  Rest, push fluids  Mucinex DM over the counter for nasal congestion/cough  Continue daily allergy medications  Doxycycline (antibiotic) as prescribed, finish all 7 days  Tessalon as prescribed as needed for cough  Advair inhaler as prescribed  Return to care for new/worsening symptoms

## 2024-07-01 NOTE — PATIENT INSTRUCTIONS
Tylenol and Motrin as needed  Rest, push fluids  Mucinex DM over the counter for nasal congestion/cough  Continue daily allergy medications  Doxycycline (antibiotic) as prescribed, finish all 7 days  Tessalon as prescribed as needed for cough  Advair inhaler as prescribed  Return to care for new/worsening symptoms

## 2024-07-17 ENCOUNTER — OFFICE VISIT (OUTPATIENT)
Dept: DERMATOLOGY CLINIC | Facility: CLINIC | Age: 44
End: 2024-07-17
Payer: COMMERCIAL

## 2024-07-17 DIAGNOSIS — D23.9 BENIGN NEOPLASM OF SKIN, UNSPECIFIED LOCATION: ICD-10-CM

## 2024-07-17 DIAGNOSIS — D18.01 HEMANGIOMA OF SKIN: ICD-10-CM

## 2024-07-17 DIAGNOSIS — D22.9 MULTIPLE NEVI: Primary | ICD-10-CM

## 2024-07-17 DIAGNOSIS — L81.3 SPOT, CAFE-AU-LAIT: ICD-10-CM

## 2024-07-17 DIAGNOSIS — L81.4 LENTIGO: ICD-10-CM

## 2024-07-17 PROCEDURE — 99203 OFFICE O/P NEW LOW 30 MIN: CPT | Performed by: DERMATOLOGY

## 2024-07-28 NOTE — PROGRESS NOTES
Melissa Anderson is a 44 year old female.  HPI:     CC:    Chief Complaint   Patient presents with    Full Skin Exam     New pt present for FBSE. Spot of concern on Lower Lip. No personal or family hx of skin cancer.            HISTORY:    Past Medical History:    Asthma (HCC)    Dysplasia of cervix    Mononucleosis    SEASONAL ALLERGIES    Seizure disorder (HCC)    infantile treated to age 3    Thyroid nodule    teenager, suppressive treatment    Vocal cord cyst    [place] teeth extracted      Past Surgical History:   Procedure Laterality Date    Carpal tunnel release      Colposcopy,bx cervix/endocerv curr      Other surgical history      Vocal cord cyst removal    Tonsillectomy  1988      Family History   Problem Relation Age of Onset    Allergies Mother     Other (osteoporosis.) Mother     Cancer Father 65        prostate ca, prostatectomy    Hypertension Father     Breast Cancer Maternal Grandmother 52        d.54    Stroke Maternal Grandfather     Heart Attack Maternal Grandfather     Cancer Maternal Grandfather 52        bladder ca; non-smoker; ; d.90    Stroke Paternal Grandmother     Breast Cancer Maternal Aunt 42        BRCA1/2 neg in 2001    Breast Cancer Maternal Aunt 51        others dx 60,63    Cancer Maternal Uncle 64        bladder ca; non-smoker    Breast Cancer Maternal Cousin Female 50        others dx 54, 56      Social History     Socioeconomic History    Marital status:    Tobacco Use    Smoking status: Never    Smokeless tobacco: Never    Tobacco comments:     No household smokers.    Vaping Use    Vaping status: Never Used   Substance and Sexual Activity    Alcohol use: Not Currently     Comment: socially    Drug use: No   Other Topics Concern    Caffeine Concern Yes     Comment: Coffee, soda     Reaction to local anesthetic No    Pt has a pacemaker No    Pt has a defibrillator No        Current Outpatient Medications   Medication Sig Dispense Refill    fluticasone-salmeterol  115-21 MCG/ACT Inhalation Aerosol Inhale 1 puff into the lungs 2 (two) times daily. 8 g 0    albuterol (PROAIR HFA) 108 (90 Base) MCG/ACT Inhalation Aero Soln Inhale 2 puffs into the lungs every 6 (six) hours as needed for Wheezing. 1 each 0    montelukast 10 MG Oral Tab Take 1 tablet (10 mg total) by mouth nightly. 90 tablet 2    levocetirizine 5 MG Oral Tab Take 1 tablet (5 mg total) by mouth every evening. 90 tablet 2    Mometasone Furoate (NASONEX) 50 MCG/ACT Nasal Suspension 2 sprays by Nasal route daily. 3 Inhaler 2    Multiple Vitamins-Minerals (MULTIVITAMIN ADULT OR) Take by mouth daily.      benzonatate 200 MG Oral Cap Take 1 capsule (200 mg total) by mouth 3 (three) times daily as needed for cough. 21 capsule 0     Allergies:   Allergies   Allergen Reactions    Pcn [Penicillins]     Sulfa Antibiotics ANXIETY       Past Medical History:    Asthma (HCC)    Dysplasia of cervix    Mononucleosis    SEASONAL ALLERGIES    Seizure disorder (HCC)    infantile treated to age 3    Thyroid nodule    teenager, suppressive treatment    Vocal cord cyst    [place] teeth extracted     Past Surgical History:   Procedure Laterality Date    Carpal tunnel release      Colposcopy,bx cervix/endocerv curr      Other surgical history      Vocal cord cyst removal    Tonsillectomy  1988     Social History     Socioeconomic History    Marital status:      Spouse name: Not on file    Number of children: Not on file    Years of education: Not on file    Highest education level: Not on file   Occupational History    Not on file   Tobacco Use    Smoking status: Never    Smokeless tobacco: Never    Tobacco comments:     No household smokers.    Vaping Use    Vaping status: Never Used   Substance and Sexual Activity    Alcohol use: Not Currently     Comment: socially    Drug use: No    Sexual activity: Not on file   Other Topics Concern     Service Not Asked    Blood Transfusions Not Asked    Caffeine Concern Yes     Comment:  Coffee, soda     Occupational Exposure Not Asked    Hobby Hazards Not Asked    Sleep Concern Not Asked    Stress Concern Not Asked    Weight Concern Not Asked    Special Diet Not Asked    Back Care Not Asked    Exercise Not Asked    Bike Helmet Not Asked    Seat Belt Not Asked    Self-Exams Not Asked    Grew up on a farm Not Asked    History of tanning Not Asked    Outdoor occupation Not Asked    Breast feeding Not Asked    Reaction to local anesthetic No    Pt has a pacemaker No    Pt has a defibrillator No   Social History Narrative    Not on file     Social Determinants of Health     Financial Resource Strain: Not on file   Food Insecurity: Not on file   Transportation Needs: Not on file   Physical Activity: Not on file   Stress: Not on file   Social Connections: Not on file   Housing Stability: Not on file     Family History   Problem Relation Age of Onset    Allergies Mother     Other (osteoporosis.) Mother     Cancer Father 65        prostate ca, prostatectomy    Hypertension Father     Breast Cancer Maternal Grandmother 52        d.54    Stroke Maternal Grandfather     Heart Attack Maternal Grandfather     Cancer Maternal Grandfather 52        bladder ca; non-smoker; ; d.90    Stroke Paternal Grandmother     Breast Cancer Maternal Aunt 42        BRCA1/2 neg in 2001    Breast Cancer Maternal Aunt 51        others dx 60,63    Cancer Maternal Uncle 64        bladder ca; non-smoker    Breast Cancer Maternal Cousin Female 50        others dx 54, 56       There were no vitals filed for this visit.    HPI:  Chief Complaint   Patient presents with    Full Skin Exam     New pt present for FBSE. Spot of concern on Lower Lip. No personal or family hx of skin cancer.      No personal  or family history of skin cancer    Patient with history of contact dermatitis.  Redness on lower lip more prominent post using steroid ointment.    Patient presents with concerns above.    Patient has been in their usual state of  health.     Past notes/ records and appropriate/relevant lab results including pathology and past body maps reviewed. Including outside notes/ PCP notes as appropriate. Updated and new information noted in current visit.     ROS:  Denies other relevant systemic complaints.      History, medications, allergies reviewed as noted.    Allergies:  Pcn [penicillins] and Sulfa antibiotics     Physical Examination:     Well-developed well-nourished patient alert oriented in no acute distress.  Exam performed, including scalp, head, neck, face,nails, hair, external eyes, including conjunctival mucosa, eyelids, lips external ears , arms, digits,palms.     Multiple light to medium brown, well marginated, uniformly pigmented, macules and papules 6 mm and less scattered on exam. pigmented lesions examined with dermoscopy benign-appearing patterns.     Waxy tannish keratotic papules scattered, cherry-red vascular papules scattered.    See map today's date for lesions noted .  See assessment and plan below for specific lesions.    Otherwise remarkable for lesions as noted on map.    See A/P  below for additional information:    Assessment / plan:    No orders of the defined types were placed in this encounter.      Meds & Refills for this Visit:  Requested Prescriptions      No prescriptions requested or ordered in this encounter         Encounter Diagnoses   Name Primary?    Multiple nevi Yes    Lentigo     Benign neoplasm of skin, unspecified location     Hemangioma of skin     Spot, cafe-au-lait        Erythema lower lip.  More telangiectatic.  Asymptomatic.  Monitor presently.  Likely postinflammatory.  Patient does have photos continue monitoring  No evidence of cancer or precancerous changes in this area.    Spider angioma left arm monitor  Multiple nevi monitor over the shoulders back, left great toe    More speckled lentiginous lesion at right lateral breast monitor  Café au lait macule macule at left flank monitor.    No  other susupicious lesions on todays  exam.    Benign-appearing nevi, no atypical features on dermoscopy reassurance given monitor.     Waxy tan keratotic papules lesions in areas of concern as noted reassurance given.  Benign nature discussed.  Possibility of cryo, alphahydroxy acids over-the-counter retinol's discussed.     No other susupicious lesions on todays  exam.    Please refer to map for specific lesions.  See additional diagnoses.  Pros cons of various therapies, risks benefits discussed.Pathophysiology discussed with patient.  Therapeutic options reviewed.  See  Medications in grid.  Instructions reviewed at length.    Benign nevi, seborrheic  keratoses, cherry angiomas:  Reassurance regarding other benign skin lesions.Signs and symptoms of skin cancer, ABCDE's of melanoma discussed with patient. Sunscreen (broad-spectrum, ideally mineral-based-UVA/UVB -SPF 30 or higher) use encouraged, sun protection/sun protective clothing, self exams reviewed Followup as noted RTC ---routine checkup    6 mos -one year or p.r.n.    Encounter Times   Including precharting, reviewing chart, prior notes obtaining history: 10 minutes, medical exam :10 minutes, notes on body map, plan, counseling 10minutes My total time spent caring for the patient on the day of the encounter: 30 minutes     The patient indicates understanding of these issues and agrees to the plan.  The patient is asked to return as noted in follow-up/ above.    This note was generated using Dragon voice recognition software.  Please contact me regarding any confusion resulting from errors in recognition..  Note to patient and family: The 21st Century Cures Act makes medical notes like these available to patients. However, be advised this is a medical document. It is intended as [id number] communication and monitoring of a patient's care needs. It is written in medical language and may contain abbreviations or verbiage that are unfamiliar. It may appear  blunt or direct. Medical documents are intended to carry relevant information, facts as evident and the clinical opinion of the practitioner.

## 2024-08-16 ENCOUNTER — LAB ENCOUNTER (OUTPATIENT)
Dept: LAB | Age: 44
End: 2024-08-16
Attending: INTERNAL MEDICINE
Payer: COMMERCIAL

## 2024-08-16 DIAGNOSIS — E78.2 MIXED HYPERLIPIDEMIA: ICD-10-CM

## 2024-08-16 DIAGNOSIS — E55.9 VITAMIN D DEFICIENCY: ICD-10-CM

## 2024-08-16 DIAGNOSIS — R73.9 HYPERGLYCEMIA: ICD-10-CM

## 2024-08-16 DIAGNOSIS — Z00.00 PHYSICAL EXAM, ANNUAL: ICD-10-CM

## 2024-08-16 LAB
ALBUMIN SERPL-MCNC: 4.4 G/DL (ref 3.2–4.8)
ALBUMIN/GLOB SERPL: 1.6 {RATIO} (ref 1–2)
ALP LIVER SERPL-CCNC: 64 U/L
ALT SERPL-CCNC: 27 U/L
ANION GAP SERPL CALC-SCNC: 6 MMOL/L (ref 0–18)
AST SERPL-CCNC: 29 U/L (ref ?–34)
BASOPHILS # BLD AUTO: 0.02 X10(3) UL (ref 0–0.2)
BASOPHILS NFR BLD AUTO: 0.4 %
BILIRUB SERPL-MCNC: 0.7 MG/DL (ref 0.3–1.2)
BUN BLD-MCNC: 12 MG/DL (ref 9–23)
BUN/CREAT SERPL: 14 (ref 10–20)
CALCIUM BLD-MCNC: 9.5 MG/DL (ref 8.7–10.4)
CHLORIDE SERPL-SCNC: 110 MMOL/L (ref 98–112)
CHOLEST SERPL-MCNC: 229 MG/DL (ref ?–200)
CO2 SERPL-SCNC: 24 MMOL/L (ref 21–32)
CREAT BLD-MCNC: 0.86 MG/DL
DEPRECATED RDW RBC AUTO: 36.6 FL (ref 35.1–46.3)
EGFRCR SERPLBLD CKD-EPI 2021: 85 ML/MIN/1.73M2 (ref 60–?)
EOSINOPHIL # BLD AUTO: 0.1 X10(3) UL (ref 0–0.7)
EOSINOPHIL NFR BLD AUTO: 1.9 %
ERYTHROCYTE [DISTWIDTH] IN BLOOD BY AUTOMATED COUNT: 12 % (ref 11–15)
EST. AVERAGE GLUCOSE BLD GHB EST-MCNC: 105 MG/DL (ref 68–126)
FASTING PATIENT LIPID ANSWER: YES
FASTING STATUS PATIENT QL REPORTED: YES
GLOBULIN PLAS-MCNC: 2.7 G/DL (ref 2–3.5)
GLUCOSE BLD-MCNC: 118 MG/DL (ref 70–99)
HBA1C MFR BLD: 5.3 % (ref ?–5.7)
HCT VFR BLD AUTO: 38.3 %
HDLC SERPL-MCNC: 64 MG/DL (ref 40–59)
HGB BLD-MCNC: 13.4 G/DL
IMM GRANULOCYTES # BLD AUTO: 0 X10(3) UL (ref 0–1)
IMM GRANULOCYTES NFR BLD: 0 %
LDLC SERPL CALC-MCNC: 147 MG/DL (ref ?–100)
LYMPHOCYTES # BLD AUTO: 1.35 X10(3) UL (ref 1–4)
LYMPHOCYTES NFR BLD AUTO: 25.9 %
MCH RBC QN AUTO: 29.3 PG (ref 26–34)
MCHC RBC AUTO-ENTMCNC: 35 G/DL (ref 31–37)
MCV RBC AUTO: 83.8 FL
MONOCYTES # BLD AUTO: 0.39 X10(3) UL (ref 0.1–1)
MONOCYTES NFR BLD AUTO: 7.5 %
NEUTROPHILS # BLD AUTO: 3.35 X10 (3) UL (ref 1.5–7.7)
NEUTROPHILS # BLD AUTO: 3.35 X10(3) UL (ref 1.5–7.7)
NEUTROPHILS NFR BLD AUTO: 64.3 %
NONHDLC SERPL-MCNC: 165 MG/DL (ref ?–130)
OSMOLALITY SERPL CALC.SUM OF ELEC: 291 MOSM/KG (ref 275–295)
PLATELET # BLD AUTO: 208 10(3)UL (ref 150–450)
POTASSIUM SERPL-SCNC: 4.4 MMOL/L (ref 3.5–5.1)
PROT SERPL-MCNC: 7.1 G/DL (ref 5.7–8.2)
RBC # BLD AUTO: 4.57 X10(6)UL
SODIUM SERPL-SCNC: 140 MMOL/L (ref 136–145)
T4 FREE SERPL-MCNC: 1.1 NG/DL (ref 0.8–1.7)
TRIGL SERPL-MCNC: 101 MG/DL (ref 30–149)
TSI SER-ACNC: 3.37 MIU/ML (ref 0.55–4.78)
VIT D+METAB SERPL-MCNC: 37.8 NG/ML (ref 30–100)
VLDLC SERPL CALC-MCNC: 19 MG/DL (ref 0–30)
WBC # BLD AUTO: 5.2 X10(3) UL (ref 4–11)

## 2024-08-16 PROCEDURE — 83036 HEMOGLOBIN GLYCOSYLATED A1C: CPT

## 2024-08-16 PROCEDURE — 80061 LIPID PANEL: CPT

## 2024-08-16 PROCEDURE — 84443 ASSAY THYROID STIM HORMONE: CPT | Performed by: INTERNAL MEDICINE

## 2024-08-16 PROCEDURE — 80053 COMPREHEN METABOLIC PANEL: CPT

## 2024-08-16 PROCEDURE — 84439 ASSAY OF FREE THYROXINE: CPT | Performed by: INTERNAL MEDICINE

## 2024-08-16 PROCEDURE — 85025 COMPLETE CBC W/AUTO DIFF WBC: CPT

## 2024-08-16 PROCEDURE — 36415 COLL VENOUS BLD VENIPUNCTURE: CPT

## 2024-08-16 PROCEDURE — 82306 VITAMIN D 25 HYDROXY: CPT

## 2024-08-26 ENCOUNTER — PATIENT MESSAGE (OUTPATIENT)
Dept: INTERNAL MEDICINE CLINIC | Facility: CLINIC | Age: 44
End: 2024-08-26

## 2024-08-26 NOTE — PROGRESS NOTES
CBC Normal (white blood cells and red blood cells and platelets),   CMP Normal (electrolytes, kidney and liver functions),   Lipid (choilesterol) is elevated.  Goal LDL should be less than 100. Careful with diet.  Avoid red meat, shellfish, pork.  Try not to kaplan foods.  Lower carb diet.  Exercise is most part at least 30 minutes 3-4 times a week sustained cardiovascular aerobic exercise getting that heart rate going and keeping it going for 30 minutes at a time.  If cannot do 30 will start with 10 minutes of brisk walking is good at each week build up 5 minutes more.  Recheck lipid in 3 months.  Orders written.  Vitamin D level looks okay.  Hemoglobin A1c which is a 3-month average of sugars looks good.  Goal is 6.5 or less.

## 2024-08-27 NOTE — TELEPHONE ENCOUNTER
Dr. Marie kindly see patient's [employer] response and advise.       Stephy Lao RN  8/27/2024  7:25 AM CDT Back to Top      Written by Talya Corley RN on 8/26/2024 11:13 AM CDT View Full Comments  Seen by patient Thomas Anderson on 8/26/2024  1:52 PM    Talya Corley RN  8/26/2024 11:13 AM CDT       Notified via [employer].  Postponed to confirm patient has reviewed message.    Amy Marie MD  8/25/2024  9:52 PM CDT       CBC Normal (white blood cells and red blood cells and platelets),  CMP Normal (electrolytes, kidney and liver functions),  Lipid (choilesterol) is elevated.  Goal LDL should be less than 100. Careful with diet.  Avoid red meat, shellfish, pork.  Try not to kaplan foods.  Lower carb diet.  Exercise is most part at least 30 minutes 3-4 times a week sustained cardiovascular aerobic exercise getting that heart rate going and keeping it going for 30 minutes at a time.  If cannot do 30 will start with 10 minutes of brisk walking is good at each week build up 5 minutes more.  Recheck lipid in 3 months.  Orders written.  Vitamin D level looks okay.  Hemoglobin A1c which is a 3-month average of sugars looks good.  Goal is 6.5 or less.

## 2024-08-28 NOTE — TELEPHONE ENCOUNTER
Please let her know that could be familial hypercholesterolemia as well I will continue and advised her to continue to watch her diet and continue to exercise continue to monitor, at this point I do not think that she needs any close medication.  She can follow-up and discuss further with Dr. Marie when she is back

## 2024-08-28 NOTE — TELEPHONE ENCOUNTER
jailyn message with MD recommendation sent to patient.      Future Appointments   Date Time Provider Department Center   9/13/2024  8:15 AM MARGARET, PROCEDURE [id number] None   11/25/2024  9:30 AM Rashad Balderas MD [id number]   4/28/2025 10:30 AM Amy Marie MD [id number] [place] [id number]   7/18/2025  2:15 PM [facility]

## 2024-09-04 ENCOUNTER — TELEPHONE (OUTPATIENT)
Facility: CLINIC | Age: 44
End: 2024-09-04

## 2024-09-04 RX ORDER — SOD SULF/POT CHLORIDE/MAG SULF 1.479 G
1 TABLET ORAL ONCE
Qty: 12 TABLET | Refills: 0 | Status: SHIPPED | OUTPATIENT
Start: 2024-09-04 | End: 2024-09-04

## 2024-09-04 NOTE — TELEPHONE ENCOUNTER
Patient was wondering if she can get Bowel preparation in tablet form for 9/13/2024 colonoscopy.  Please call.  Thank you.

## 2024-09-04 NOTE — TELEPHONE ENCOUNTER
Spoke to patient  Confirmed pharmacy on file    Sutab sent to pharmacy  Sutab instructions sent via [employer]

## 2024-09-11 NOTE — PAT NURSING NOTE
Patient instructed to hold  vitamins 7 days prior to procedure per the Pre-surgical testing policy. Location and date of procedure verified with patient who verbalized understanding .

## 2024-09-13 ENCOUNTER — HOSPITAL ENCOUNTER (OUTPATIENT)
Age: 44
Setting detail: HOSPITAL OUTPATIENT SURGERY
Discharge: HOME | End: 2024-09-13
Attending: INTERNAL MEDICINE | Admitting: INTERNAL MEDICINE
Payer: COMMERCIAL

## 2024-09-13 ENCOUNTER — ANESTHESIA EVENT (OUTPATIENT)
Dept: ENDOSCOPY | Age: 44
End: 2024-09-13
Payer: COMMERCIAL

## 2024-09-13 ENCOUNTER — ANESTHESIA (OUTPATIENT)
Dept: ENDOSCOPY | Age: 44
End: 2024-09-13
Payer: COMMERCIAL

## 2024-09-13 VITALS
SYSTOLIC BLOOD PRESSURE: 101 MMHG | RESPIRATION RATE: 20 BRPM | DIASTOLIC BLOOD PRESSURE: 74 MMHG | OXYGEN SATURATION: 100 % | TEMPERATURE: 98 F | WEIGHT: 180 LBS | HEART RATE: 69 BPM | BODY MASS INDEX: 30.73 KG/M2 | HEIGHT: 64 IN

## 2024-09-13 DIAGNOSIS — Z12.11 SCREEN FOR COLON CANCER: ICD-10-CM

## 2024-09-13 LAB — B-HCG UR QL: NEGATIVE

## 2024-09-13 PROCEDURE — 99070 SPECIAL SUPPLIES PHYS/QHP: CPT | Performed by: INTERNAL MEDICINE

## 2024-09-13 PROCEDURE — 45385 COLONOSCOPY W/LESION REMOVAL: CPT | Performed by: INTERNAL MEDICINE

## 2024-09-13 PROCEDURE — 81025 URINE PREGNANCY TEST: CPT

## 2024-09-13 PROCEDURE — 88305 TISSUE EXAM BY PATHOLOGIST: CPT | Performed by: INTERNAL MEDICINE

## 2024-09-13 RX ORDER — LIDOCAINE HYDROCHLORIDE 10 MG/ML
INJECTION, SOLUTION EPIDURAL; INFILTRATION; INTRACAUDAL; PERINEURAL AS NEEDED
Status: DISCONTINUED | OUTPATIENT
Start: 2024-09-13 | End: 2024-09-13 | Stop reason: SURG

## 2024-09-13 RX ORDER — NALOXONE HYDROCHLORIDE 0.4 MG/ML
0.08 INJECTION, SOLUTION INTRAMUSCULAR; INTRAVENOUS; SUBCUTANEOUS ONCE AS NEEDED
Status: DISCONTINUED | OUTPATIENT
Start: 2024-09-13 | End: 2024-09-13

## 2024-09-13 RX ORDER — SODIUM CHLORIDE, SODIUM LACTATE, POTASSIUM CHLORIDE, CALCIUM CHLORIDE 600; 310; 30; 20 MG/100ML; MG/100ML; MG/100ML; MG/100ML
INJECTION, SOLUTION INTRAVENOUS CONTINUOUS
Status: DISCONTINUED | OUTPATIENT
Start: 2024-09-13 | End: 2024-09-13

## 2024-09-13 RX ADMIN — SODIUM CHLORIDE, SODIUM LACTATE, POTASSIUM CHLORIDE, CALCIUM CHLORIDE: 600; 310; 30; 20 INJECTION, SOLUTION INTRAVENOUS at 07:56:00

## 2024-09-13 RX ADMIN — LIDOCAINE HYDROCHLORIDE 30 MG: 10 INJECTION, SOLUTION EPIDURAL; INFILTRATION; INTRACAUDAL; PERINEURAL at 08:01:00

## 2024-09-13 NOTE — OPERATIVE REPORT
COLONOSCOPY REPORT    Melissa Anderson     1980 Age 44 year old   PCP Amy Marie MD Endoscopist Luz Baxter MD       Date of procedure: 24    Procedure: Colonoscopy w/ cold snare polypectomy     Pre-operative diagnosis: CRC screening     Post-operative diagnosis: colon polyp, hemorrhoids     Medications: MAC    Withdrawal time: 11 minutes    Procedure:  Informed consent was obtained from the patient after the risks of the procedure were discussed, including but not limited to bleeding, perforation, aspiration, infection, or possibility of a missed lesion. After discussions of the risks/benefits and alternatives to this procedure, as well as the planned sedation, the patient was placed in the left lateral decubitus position and begun on continuous blood pressure pulse oximetry and EKG monitoring and this was maintained throughout the procedure. Once an adequate level of sedation was obtained a digital rectal exam was completed. Then the lubricated tip of the [id number] diagnostic video colonoscope was inserted and advanced without difficulty to the cecum using water immersion and CO2 insufflation technique. The cecum was identified by localizing the trifold, the appendix and the ileocecal valve. Withdrawal was begun with thorough washing and careful examination of the colonic walls and folds. A routine second examination of the cecum/ascending colon was performed. Photodocumentation was obtained. The bowel prep was excellent. Views of the colon were excellent with washing. I then carefully withdrew the instrument from the patient who tolerated the procedure well.     Complications: none.    Findings:   1. Somewhat sharp angulation in the sigmoid colon. This was traversed with a pediatric colonoscope.     2. One polyp noted as follows:      A. 7 mm polyp in the ascending colon; flat/semi-sessile morphology; cold snare polypectomy performed, polyp retrieved.    3. Diverticulosis:  none.    4. Ileocecal valve appeared healthy and normal.    5. The colonic mucosa throughout the colon showed normal vascular pattern, without evidence of angioectasias or inflammation.     6. A retroflexed view of the rectum revealed small internal hemorrhoids.    7. TIERRA: normal rectal tone, no masses palpated.     Impression:   One sub-centimeter polyp removed.   Small internal hemorrhoids.   The colon was otherwise normal with glistening mucosa and intact vascular pattern.    Recommend:  Await pathology. The interval for the next colonoscopy will be determined after reviewing pathology. If new signs or symptoms develop, colonoscopy may need to be repeated sooner.   High fiber diet.  Monitor for blood in the stool. If having more than just tinge of blood, call office or go to the ER.  Avoid NSAIDs (motrin, ibuprofen, aleve, advil, naproxen, midol, naprosyn, excedrin) for 14 days.     >>>If tissue was obtained and you have not received your pathology results either by phone or letter within 2 weeks, please call our office at [phone].    Specimens: colon polyp     Blood loss: <1 ml

## 2024-09-13 NOTE — DISCHARGE INSTRUCTIONS
Home Care Instructions for Colonoscopy with Sedation    Diet:  - Resume your regular diet as tolerated unless otherwise instructed.  - Start with light meals to minimize bloating.  - Do not drink alcohol today.    Medication:  - If you have questions about resuming your normal medications, please contact your Primary Care Physician.    Activities:  - Take it easy today. Do not return to work today.  - Do not drive today.  - Do not operate any machinery today (including kitchen equipment).    Colonoscopy:  - You may notice some rectal \"spotting\" (a little blood on the toilet tissue) for a day or two after the exam. This is normal.  - If you experience any rectal bleeding (not spotting), persistent tenderness or sharp severe abdominal pains, oral temperature over 100 degrees Fahrenheit, light-headedness or dizziness, or any other problems, contact your doctor.      **If unable to reach your doctor, please go to the [facility] Emergency Room**    - Your referring physician will receive a full report of your examination.  - If you do not hear from your doctor's office within two weeks of your biopsy, please call them for your results.    You may be able to see your laboratory results in [facility] between 4 and 7 business days.  In some cases, your physician may not have viewed the results before they are released to nuMVC.  If you have questions regarding your results contact the physician who ordered the test/exam by phone or via nuM[facility] by choosing \"Ask a Medical Question.\"

## 2024-09-13 NOTE — H&P
History & Physical Examination    Patient Name: Melissa Anderson  MRN: [id number]  [facility]: [id number]  YOB: 1980    Diagnosis: screening for colon cancer    Medications Prior to Admission   Medication Sig Dispense Refill Last Dose    albuterol (PROAIR HFA) 108 (90 Base) MCG/ACT Inhalation Aero Soln Inhale 2 puffs into the lungs every 6 (six) hours as needed for Wheezing. 1 each 0  at prn    montelukast 10 MG Oral Tab Take 1 tablet (10 mg total) by mouth nightly. 90 tablet 2 9/10    levocetirizine 5 MG Oral Tab Take 1 tablet (5 mg total) by mouth every evening. 90 tablet 2 9/10    Mometasone Furoate (NASONEX) 50 MCG/ACT Nasal Suspension 2 sprays by Nasal route daily. 3 Inhaler 2     Multiple Vitamins-Minerals (MULTIVITAMIN ADULT OR) Take by mouth daily.       [] Sodium Sulfate-Mag Sulfate-KCl (SUTAB) 8176-617-819 MG Oral Tab Take 1 each by mouth one time for 1 dose. 12 tablet 0     benzonatate 200 MG Oral Cap Take 1 capsule (200 mg total) by mouth 3 (three) times daily as needed for cough. (Patient not taking: Reported on 2024) 21 capsule 0 Not Taking    [] Doxycycline Hyclate 100 MG Oral Tab Take 1 tablet (100 mg total) by mouth 2 (two) times daily for 7 days. (Patient not taking: Reported on 2024) 14 tablet 0 Not Taking    fluticasone-salmeterol 115-21 MCG/ACT Inhalation Aerosol Inhale 1 puff into the lungs 2 (two) times daily. (Patient not taking: Reported on 2024) 8 g 0 Not Taking     Current Facility-Administered Medications   Medication Dose Route Frequency    lactated ringers infusion   Intravenous Continuous       Allergies:   Allergies   Allergen Reactions    Pcn [Penicillins]     Sulfa Antibiotics ANXIETY       Past Medical History:    Asthma (HCC)    Dysplasia of cervix    Mononucleosis    SEASONAL ALLERGIES    Seizure disorder (HCC)    infantile treated to age 3    Thyroid nodule    teenager, suppressive treatment    Vocal cord cyst    [place] teeth extracted      Past Surgical History:   Procedure Laterality Date    Carpal tunnel release      Colposcopy,bx cervix/endocerv curr      Other surgical history      Vocal cord cyst removal    Tonsillectomy  1988     Family History   Problem Relation Age of Onset    Allergies Mother     Other (osteoporosis.) Mother     Cancer Father 65        prostate ca, prostatectomy    Hypertension Father     Breast Cancer Maternal Grandmother 52        d.54    Stroke Maternal Grandfather     Heart Attack Maternal Grandfather     Cancer Maternal Grandfather 52        bladder ca; non-smoker; ; d.90    Stroke Paternal Grandmother     Breast Cancer Maternal Aunt 42        BRCA1/2 neg in 2001    Breast Cancer Maternal Aunt 51        others dx 60,63    Cancer Maternal Uncle 64        bladder ca; non-smoker    Breast Cancer Maternal Cousin Female 50        others dx 54, 56     Social History     Tobacco Use    Smoking status: Never    Smokeless tobacco: Never    Tobacco comments:     No household smokers.    Substance Use Topics    Alcohol use: Yes     Comment: socially       SYSTEM Check if Review is Normal Check if Physical Exam is Normal If not normal, please explain:   HEENT [X ] [ X]    NECK  [X ] [ X]    HEART [X ] [ X]    LUNGS [X ] [ X]    ABDOMEN [X ] [ X]    EXTREMITIES [X ] [ X]    OTHER        I have discussed the risks and benefits and alternatives of the procedure with the patient/family.  They understand and agree to proceed with plan of care.   I have reviewed the History and Physical done within the last 30 days.  Any changes noted above.    Luz Baxter MD  [facility]

## 2024-09-13 NOTE — ANESTHESIA PREPROCEDURE EVALUATION
Anesthesia PreOp Note    HPI:     Melissa Anderson is a 44 year old female who presents for preoperative consultation requested by: Luz Baxter MD    Date of Surgery: 9/13/2024    Procedure(s):  COLONOSCOPY  Indication: Screen for colon cancer    Relevant Problems   No relevant active problems       NPO:  Last Liquid Consumption Date: 09/13/24  Last Liquid Consumption Time: 0445  Last Solid Consumption Date: 09/12/24  Last Solid Consumption Time: 0800  Last Liquid Consumption Date: 09/13/24          History Review:  Patient Active Problem List    Diagnosis Date Noted    Breast cancer screening, high risk patient 02/12/2024    Thyroid nodule     Vitamin D deficiency 11/11/2021    Mixed hyperlipidemia 11/11/2021    Hyperglycemia 11/11/2021    Vaccine counseling 11/05/2021    Breast cancer screening by mammogram 11/05/2021    Pap smear for cervical cancer screening 11/05/2021    Acquired hallux valgus of right foot 09/14/2021    Capsulitis of metatarsophalangeal (MTP) joint of right foot 09/14/2021    Neuroma 09/14/2021    Physical exam, annual 04/06/2017    Family history of malignant neoplasm of breast 06/02/2016    Myopia 04/21/2014    ASTHMA 10/22/2010       Past Medical History:    Asthma (HCC)    Dysplasia of cervix    Mononucleosis    SEASONAL ALLERGIES    Seizure disorder (HCC)    infantile treated to age 3    Thyroid nodule    teenager, suppressive treatment    Vocal cord cyst    [place] teeth extracted       Past Surgical History:   Procedure Laterality Date    Carpal tunnel release      Colposcopy,bx cervix/endocerv curr      Other surgical history      Vocal cord cyst removal    Tonsillectomy  1988       Medications Prior to Admission   Medication Sig Dispense Refill Last Dose    albuterol (PROAIR HFA) 108 (90 Base) MCG/ACT Inhalation Aero Soln Inhale 2 puffs into the lungs every 6 (six) hours as needed for Wheezing. 1 each 0  at prn    montelukast 10 MG Oral Tab Take 1 tablet (10 mg  total) by mouth nightly. 90 tablet 2 9    levocetirizine 5 MG Oral Tab Take 1 tablet (5 mg total) by mouth every evening. 90 tablet 2     Mometasone Furoate (NASONEX) 50 MCG/ACT Nasal Suspension 2 sprays by Nasal route daily. 3 Inhaler 2     Multiple Vitamins-Minerals (MULTIVITAMIN ADULT OR) Take by mouth daily.       [] Sodium Sulfate-Mag Sulfate-KCl (SUTAB) 6347-050-489 MG Oral Tab Take 1 each by mouth one time for 1 dose. 12 tablet 0     benzonatate 200 MG Oral Cap Take 1 capsule (200 mg total) by mouth 3 (three) times daily as needed for cough. (Patient not taking: Reported on 2024) 21 capsule 0 Not Taking    [] Doxycycline Hyclate 100 MG Oral Tab Take 1 tablet (100 mg total) by mouth 2 (two) times daily for 7 days. (Patient not taking: Reported on 2024) 14 tablet 0 Not Taking    fluticasone-salmeterol 115-21 MCG/ACT Inhalation Aerosol Inhale 1 puff into the lungs 2 (two) times daily. (Patient not taking: Reported on 2024) 8 g 0 Not Taking     Current Facility-Administered Medications Ordered in Epic   Medication Dose Route Frequency Provider Last Rate Last Admin    lactated ringers infusion   Intravenous Continuous Luz Baxter MD         No current [facility]-ordered outpatient medications on file.       Allergies   Allergen Reactions    Pcn [Penicillins]     Sulfa Antibiotics ANXIETY       Family History   Problem Relation Age of Onset    Allergies Mother     Other (osteoporosis.) Mother     Cancer Father 65        prostate ca, prostatectomy    Hypertension Father     Breast Cancer Maternal Grandmother 52        d.54    Stroke Maternal Grandfather     Heart Attack Maternal Grandfather     Cancer Maternal Grandfather 52        bladder ca; non-smoker; ; d.90    Stroke Paternal Grandmother     Breast Cancer Maternal Aunt 42        BRCA1/2 neg in     Breast Cancer Maternal Aunt 51        others dx 60,63    Cancer Maternal Uncle 64        bladder ca; non-smoker     Breast Cancer Maternal Cousin Female 50        others dx 54, 56     Social History     Socioeconomic History    Marital status:    Tobacco Use    Smoking status: Never    Smokeless tobacco: Never    Tobacco comments:     No household smokers.    Vaping Use    Vaping status: Never Used   Substance and Sexual Activity    Alcohol use: Yes     Comment: socially    Drug use: No   Other Topics Concern    Caffeine Concern Yes     Comment: Coffee, soda     Reaction to local anesthetic No    Pt has a pacemaker No    Pt has a defibrillator No       Available pre-op labs reviewed.  Lab Results   Component Value Date    WBC 5.2 08/16/2024    RBC 4.57 08/16/2024    HGB 13.4 08/16/2024    HCT 38.3 08/16/2024    MCV 83.8 08/16/2024    MCH 29.3 08/16/2024    MCHC 35.0 08/16/2024    RDW 12.0 08/16/2024    .0 08/16/2024     Lab Results   Component Value Date     08/16/2024    K 4.4 08/16/2024     08/16/2024    CO2 24.0 08/16/2024    BUN 12 08/16/2024    CREATSERUM 0.86 08/16/2024     (H) 08/16/2024    CA 9.5 08/16/2024          Vital Signs:  Body mass index is 30.9 kg/m².   height is 1.626 m (5' 4\") and weight is 81.6 kg (180 lb).   Vitals:    09/11/24 1133   Weight: 81.6 kg (180 lb)   Height: 1.626 m (5' 4\")        Anesthesia Evaluation      Airway   Mallampati: II  TM distance: >3 FB  Neck ROM: full  Dental      Pulmonary - normal exam   (+) asthma  Cardiovascular - normal exam    Neuro/Psych      GI/Hepatic/Renal      Endo/Other    Abdominal   (-) obese                 Anesthesia Plan:   ASA:  2  Plan:   MAC  Informed Consent Plan and Risks Discussed With:  Patient      I have informed Melissa Anderson and/or legal guardian or family member of the nature of the anesthetic plan, benefits, risks including possible dental damage if relevant, major complications, and any alternative forms of anesthetic management.   All of the patient's questions were answered to the best of my ability. The  patient desires the anesthetic management as planned.  Sergey Miller DO  9/13/2024 7:16 AM  Present on Admission:  **None**

## 2024-09-17 ENCOUNTER — TELEPHONE (OUTPATIENT)
Dept: ALLERGY | Facility: CLINIC | Age: 44
End: 2024-09-17

## 2024-09-17 NOTE — TELEPHONE ENCOUNTER
Spoke to patient. Informed her that we do not need repeat blood work since it will be within the year. Patient to  contact our office 2 weeks prior to appointment for PCN RX.   Pt verbalizes her understanding, and is agreeable to plan of care.

## 2024-09-17 NOTE — TELEPHONE ENCOUNTER
Patient asking for an allergy testing appointment for penicillin.  Patient states this is an elective testing and would like to schedule after the holidays, First week of January or after.  Call her at:  [phone]

## 2024-09-17 NOTE — TELEPHONE ENCOUNTER
RN helped to reschedule oral challenge to penicillin  Scheduled appointment for 1/15/24   Patient aware to contact office a week before the appointment so staff can send out penicillin order  Will stop antihistamines 5 days before appointment   Patient aware it is a longer appointment about 2 hours long  Denies further questions at this time       Dr. Balderas please advise  Patient completed blood work in 5/2024  Scheduled oral challenge to penicillin on 1/15/24  Would patient need to repeat blood work before oral challenge?

## 2024-09-18 ENCOUNTER — TELEPHONE (OUTPATIENT)
Facility: CLINIC | Age: 44
End: 2024-09-18

## 2024-09-18 NOTE — PROGRESS NOTES
GI Staff:    Can you please place recall for this patient to have a colonoscopy in 5 years.    Thank you,  Luz

## 2024-09-18 NOTE — TELEPHONE ENCOUNTER
----- Message from Luz Baxter sent at 9/18/2024  2:05 PM CDT -----  GI Staff:    Can you please place recall for this patient to have a colonoscopy in 5 years.    Thank you,  Luz

## 2024-09-18 NOTE — TELEPHONE ENCOUNTER
5  year colonoscopy recall entered. Health maintenance updated.    Colonoscopy done on 9/13/24 and next due on 9/13/29.

## 2024-09-25 ENCOUNTER — ORDER TRANSCRIPTION (OUTPATIENT)
Dept: ADMINISTRATIVE | Facility: HOSPITAL | Age: 44
End: 2024-09-25

## 2024-09-25 DIAGNOSIS — Z13.6 SCREENING FOR CARDIOVASCULAR CONDITION: Primary | ICD-10-CM

## 2024-10-28 ENCOUNTER — HOSPITAL ENCOUNTER (OUTPATIENT)
Dept: CT IMAGING | Age: 44
Discharge: HOME | End: 2024-10-28
Attending: INTERNAL MEDICINE

## 2024-10-28 DIAGNOSIS — Z13.6 SCREENING FOR CARDIOVASCULAR CONDITION: ICD-10-CM

## 2024-11-11 ENCOUNTER — PATIENT MESSAGE (OUTPATIENT)
Dept: INTERNAL MEDICINE CLINIC | Facility: CLINIC | Age: 44
End: 2024-11-11

## 2024-11-15 ENCOUNTER — LAB ENCOUNTER (OUTPATIENT)
Dept: LAB | Age: 44
End: 2024-11-15
Attending: INTERNAL MEDICINE
Payer: COMMERCIAL

## 2024-11-15 DIAGNOSIS — E78.2 MIXED HYPERLIPIDEMIA: ICD-10-CM

## 2024-11-15 LAB
CHOLEST SERPL-MCNC: 140 MG/DL (ref ?–200)
FASTING PATIENT LIPID ANSWER: YES
HDLC SERPL-MCNC: 61 MG/DL (ref 40–59)
LDLC SERPL CALC-MCNC: 68 MG/DL (ref ?–100)
NONHDLC SERPL-MCNC: 79 MG/DL (ref ?–130)
TRIGL SERPL-MCNC: 52 MG/DL (ref 30–149)
VLDLC SERPL CALC-MCNC: 8 MG/DL (ref 0–30)

## 2024-11-15 PROCEDURE — 36415 COLL VENOUS BLD VENIPUNCTURE: CPT

## 2024-11-15 PROCEDURE — 80061 LIPID PANEL: CPT

## 2025-02-04 RX ORDER — LEVOCETIRIZINE DIHYDROCHLORIDE 5 MG/1
5 TABLET, FILM COATED ORAL EVERY EVENING
Qty: 90 TABLET | Refills: 0 | Status: SHIPPED | OUTPATIENT
Start: 2025-02-04

## 2025-02-04 NOTE — TELEPHONE ENCOUNTER
Refill requested for   Name from pharmacy: LEVOCETIRIZINE 5MG TABLETS          Will file in chart as: LEVOCETIRIZINE 5 MG Oral Tab    Sig: TAKE 1 TABLET(5 MG) BY MOUTH EVERY EVENING    Disp: 90 tablet    Refills: 2 (Pharmacy requested: Not specified)    Start: 2/4/2025    Class: Normal    Non-formulary    Last ordered: 8 months ago (5/18/2024) by Rashad Balderas MD    Last refill: 11/9/2024    Rx #: [id number]    Antihistamines Passed 02/04/2025 09:06 AM   Protocol Details Appt in past 12 mos or next 1 mos    Medication is active on med list      [id number] [place], [place] - [address] AT [facility], [phone], [phone]       Last office visit: 5/18/24    Previously advised to follow up in : 1 year    F/U currently scheduled? Not at this time      Date of last refill: 5/18/24    ACTION: Refilled per protocol.

## 2025-02-13 RX ORDER — ATORVASTATIN CALCIUM 20 MG/1
20 TABLET, FILM COATED ORAL NIGHTLY
Qty: 90 TABLET | Refills: 1 | Status: SHIPPED | OUTPATIENT
Start: 2025-02-13

## 2025-02-14 NOTE — TELEPHONE ENCOUNTER
Refill Passed Per Protocol    Requested Prescriptions   Pending Prescriptions Disp Refills    ATORVASTATIN 20 MG Oral Tab [Pharmacy Med Name: ATORVASTATIN 20MG TABLETS] 30 tablet 4     Sig: TAKE 1 TABLET(20 MG) BY MOUTH EVERY NIGHT       Cholesterol Medication Protocol Passed - 2/13/2025 11:09 PM        Passed - ALT < 80     Lab Results   Component Value Date    ALT 27 08/16/2024             Passed - ALT resulted within past year        Passed - Lipid panel within past 12 months     Lab Results   Component Value Date    CHOLEST 140 11/15/2024    TRIG 52 11/15/2024    HDL 61 (H) 11/15/2024    LDL 68 11/15/2024    VLDL 8 11/15/2024    NONHDLC 79 11/15/2024             Passed - In person appointment or virtual visit in the past 12 mos or appointment in next 3 mos     Recent Outpatient Visits              7 months ago Multiple nevi    [facility] Corrie Lopez MD    Office Visit    7 months ago Acute frontal sinusitis, recurrence not specified    [facility], [facility] Anahi Ellsworth APRN    Office Visit    7 months ago Pre-employment examination    [facility] Maricruz Uribe MD    Office Visit    9 months ago Chronic idiopathic urticaria    [facility] Rashad Balderas MD    Office Visit    9 months ago Screen for colon cancer    [facility]    Nurse Only          Future Appointments         Provider Department Appt Notes    In 1 month Amy Marie MD [facility] physical    In 5 months Corrie Lopez MD [facility] yearly body ck                    Passed - Medication is active on med list             Future Appointments         Provider Department Appt Notes    In 1 month Amy Marie MD  [facility] physical    In 5 months [facility]          Recent Outpatient Visits              7 months ago Multiple nevi    [facility] MD    Office Visit    7 months ago Acute frontal sinusitis, recurrence not specified    [facility], [facility] [place] Anahi Ellsworth, APRN    Office Visit    7 months ago Pre-employment examination    [facility] Maricruz Uribe MD    Office Visit    9 months ago Chronic idiopathic urticaria    [facility]    Office Visit    9 months ago Screen for colon cancer    [facility]    Nurse Only

## 2025-04-09 PROBLEM — Z12.31 BREAST CANCER SCREENING BY MAMMOGRAM: Status: RESOLVED | Noted: 2021-11-05 | Resolved: 2025-04-09

## 2025-04-10 ENCOUNTER — OFFICE VISIT (OUTPATIENT)
Dept: INTERNAL MEDICINE CLINIC | Facility: CLINIC | Age: 45
End: 2025-04-10

## 2025-04-10 VITALS
SYSTOLIC BLOOD PRESSURE: 122 MMHG | OXYGEN SATURATION: 100 % | BODY MASS INDEX: 33.29 KG/M2 | WEIGHT: 195 LBS | TEMPERATURE: 97 F | DIASTOLIC BLOOD PRESSURE: 82 MMHG | HEIGHT: 64 IN | HEART RATE: 94 BPM

## 2025-04-10 DIAGNOSIS — E04.1 THYROID NODULE: ICD-10-CM

## 2025-04-10 DIAGNOSIS — M20.11 ACQUIRED HALLUX VALGUS OF RIGHT FOOT: Primary | ICD-10-CM

## 2025-04-10 DIAGNOSIS — Z71.85 VACCINE COUNSELING: ICD-10-CM

## 2025-04-10 DIAGNOSIS — E55.9 VITAMIN D DEFICIENCY: ICD-10-CM

## 2025-04-10 DIAGNOSIS — H52.10 MYOPIA, UNSPECIFIED LATERALITY: ICD-10-CM

## 2025-04-10 DIAGNOSIS — Z12.39 BREAST CANCER SCREENING, HIGH RISK PATIENT: ICD-10-CM

## 2025-04-10 DIAGNOSIS — E78.2 MIXED HYPERLIPIDEMIA: ICD-10-CM

## 2025-04-10 DIAGNOSIS — Z80.3 FAMILY HISTORY OF MALIGNANT NEOPLASM OF BREAST: ICD-10-CM

## 2025-04-10 DIAGNOSIS — Z12.4 PAP SMEAR FOR CERVICAL CANCER SCREENING: ICD-10-CM

## 2025-04-10 DIAGNOSIS — R73.9 HYPERGLYCEMIA: ICD-10-CM

## 2025-04-10 DIAGNOSIS — J45.20 MILD INTERMITTENT ASTHMA WITHOUT COMPLICATION (HCC): ICD-10-CM

## 2025-04-10 DIAGNOSIS — M77.51 CAPSULITIS OF METATARSOPHALANGEAL (MTP) JOINT OF RIGHT FOOT: ICD-10-CM

## 2025-04-10 DIAGNOSIS — Z00.00 PHYSICAL EXAM, ANNUAL: ICD-10-CM

## 2025-04-10 LAB
APPEARANCE: CLEAR
BILIRUBIN: NEGATIVE
GLUCOSE (URINE DIPSTICK): NEGATIVE MG/DL
KETONES (URINE DIPSTICK): NEGATIVE MG/DL
MULTISTIX LOT#: ABNORMAL NUMERIC
NITRITE, URINE: NEGATIVE
OCCULT BLOOD: NEGATIVE
PH, URINE: 7 (ref 4.5–8)
PROTEIN (URINE DIPSTICK): NEGATIVE MG/DL
SPECIFIC GRAVITY: 1.01 (ref 1–1.03)
URINE-COLOR: YELLOW
UROBILINOGEN,SEMI-QN: 0.2 MG/DL (ref 0–1.9)

## 2025-04-10 PROCEDURE — 3008F BODY MASS INDEX DOCD: CPT | Performed by: INTERNAL MEDICINE

## 2025-04-10 PROCEDURE — 90715 TDAP VACCINE 7 YRS/> IM: CPT | Performed by: INTERNAL MEDICINE

## 2025-04-10 PROCEDURE — 3079F DIAST BP 80-89 MM HG: CPT | Performed by: INTERNAL MEDICINE

## 2025-04-10 PROCEDURE — 90471 IMMUNIZATION ADMIN: CPT | Performed by: INTERNAL MEDICINE

## 2025-04-10 PROCEDURE — 3074F SYST BP LT 130 MM HG: CPT | Performed by: INTERNAL MEDICINE

## 2025-04-10 PROCEDURE — 90677 PCV20 VACCINE IM: CPT | Performed by: INTERNAL MEDICINE

## 2025-04-10 PROCEDURE — 90472 IMMUNIZATION ADMIN EACH ADD: CPT | Performed by: INTERNAL MEDICINE

## 2025-04-10 PROCEDURE — 99396 PREV VISIT EST AGE 40-64: CPT | Performed by: INTERNAL MEDICINE

## 2025-04-10 PROCEDURE — 81003 URINALYSIS AUTO W/O SCOPE: CPT | Performed by: INTERNAL MEDICINE

## 2025-04-10 NOTE — PATIENT INSTRUCTIONS
ASSESSMENT/PLAN:     Encounter Diagnoses   Name Primary?    Acquired hallux valgus of right foot Stable.    Yes    Mild intermittent asthma without complication (HCC) Stable. FU allergy.        Breast cancer screening, high risk patient Normal mammogram. Has been staggering mammograms with MRIs of the breast every 6 months.  Continue self breast exam every month.   high risk breast clinic.        Capsulitis of metatarsophalangeal (MTP) joint of right foot Stable.        Family history of malignant neoplasm of breast Fu breast clinic.        Hyperglycemia Check blood.        Mixed hyperlipidemia Check blood.        Myopia, unspecified laterality Stable.        Pap smear for cervical cancer screening Up to date. Fu gyne.        Physical exam, annual Check urine.        Thyroid nodule Check blood.        Vaccine counseling Tdap and PCV 20 today. Recc. Covid booster. Can get at local pharmacy or [facility] office.        Vitamin D deficiency Check blood.         Orders Placed This Encounter   Procedures    Lipid Panel    Comp Metabolic Panel (14)    Hemoglobin A1C    Vitamin D    TSH W Reflex To Free T4    Free T4, (Free Thyroxine)    URINALYSIS, AUTO, W/O SCOPE    Prevnar 20 (PCV20) [19212]    TETANUS, DIPHTHERIA TOXOIDS AND ACELLULAR PERTUSIS VACCINE (TDAP), >7 YEARS, IM USE       Meds This Visit:  Requested Prescriptions      No prescriptions requested or ordered in this encounter       Imaging & Referrals:  PCV20 VACCINE FOR INTRAMUSCULAR USE  TETANUS, DIPHTHERIA TOXOIDS AND ACELLULAR PERTUSIS VACCINE (TDAP), >7 YEARS, IM USE      RTC 1 yr. for physical.

## 2025-04-10 NOTE — PROGRESS NOTES
HPI:    Patient ID: Melissa Anderson is a 44 year old female.    Melissa Anderson is a 44 year old female who presents for a complete physical exam.   HPI:     Chief Complaint   Patient presents with    Physical     Patient states she is here for an Annul Physical Examination.         Patient's last menstrual period was 06/23/2024 (exact date).LNMP; 3-25-25. [id number].    Symptoms: denies discharge, itching, burning or dysuria, periods are regular, flow is 2-3 days. No pain. Lighter.     /84 (BP Location: Right arm, Patient Position: Sitting, Cuff Size: large)   Pulse 94   Temp 97.3 °F (36.3 °C) (Temporal)   Ht 5' 4\" (1.626 m)   Wt 195 lb (88.5 kg)   LMP 06/23/2024 (Exact Date)   SpO2 100%   BMI 33.47 kg/m²    Wt Readings from Last 4 Encounters:   04/10/25 195 lb (88.5 kg)   09/11/24 180 lb (81.6 kg)   07/01/24 188 lb 12.8 oz (85.6 kg)   06/19/24 186 lb (84.4 kg)     Body mass index is 33.47 kg/m².     Labs:   Lab Results   Component Value Date/Time    WBC 5.2 08/16/2024 07:54 AM    HGB 13.4 08/16/2024 07:54 AM    .0 08/16/2024 07:54 AM      Lab Results   Component Value Date/Time     (H) 08/16/2024 07:54 AM     08/16/2024 07:54 AM    K 4.4 08/16/2024 07:54 AM     08/16/2024 07:54 AM    CO2 24.0 08/16/2024 07:54 AM    CREATSERUM 0.86 08/16/2024 07:54 AM    CA 9.5 08/16/2024 07:54 AM    ALB 4.4 08/16/2024 07:54 AM    TP 7.1 08/16/2024 07:54 AM    ALKPHO 64 08/16/2024 07:54 AM    AST 29 08/16/2024 07:54 AM    ALT 27 08/16/2024 07:54 AM    BILT 0.7 08/16/2024 07:54 AM    TSH 3.368 08/16/2024 07:54 AM    T4F 1.1 08/16/2024 07:54 AM        Lab Results   Component Value Date/Time    CHOLEST 140 11/15/2024 08:04 AM    HDL 61 (H) 11/15/2024 08:04 AM    TRIG 52 11/15/2024 08:04 AM    LDL 68 11/15/2024 08:04 AM    NONHDLC 79 11/15/2024 08:04 AM       Lab Results   Component Value Date/Time    A1C 5.3 08/16/2024 07:54 AM      Lab Results   Component Value Date    VITD 37.8 08/16/2024          Health Maintenance   Topic Date Due    Mammogram  2025       Current Medications[1]   Past Medical History[2]   Past Surgical History[3]   Family History[4]   Social History:  Social Hx on file[5]        GYNE HISTORY:  OB History    Para Term  AB Living   1 1 0 1 0 1   SAB IAB Ectopic Multiple Live Births   0 0 0 0 1    FT  X 1. Desats. X 10 days in hospital. 2 weeks early.     Hx of Pap: all Paps abnormal      and  NL colpo. which was when pregnant.      Labs:   Lab Results   Component Value Date/Time     (H) 2024 07:54 AM     2024 07:54 AM    K 4.4 2024 07:54 AM     2024 07:54 AM    CO2 24.0 2024 07:54 AM    CREATSERUM 0.86 2024 07:54 AM    CA 9.5 2024 07:54 AM    AST 29 2024 07:54 AM    ALT 27 2024 07:54 AM    TSH 3.368 2024 07:54 AM    T4F 1.1 2024 07:54 AM        Lab Results   Component Value Date/Time    CHOLEST 140 11/15/2024 08:04 AM    HDL 61 (H) 11/15/2024 08:04 AM    TRIG 52 11/15/2024 08:04 AM    LDL 68 11/15/2024 08:04 AM    NONHDLC 79 11/15/2024 08:04 AM           HPI      Review of Systems   Constitutional:  Positive for unexpected weight change. Negative for activity change, appetite change, chills, diaphoresis, fatigue and fever.   HENT:  Negative for congestion, dental problem, drooling, ear discharge, ear pain, facial swelling, hearing loss, mouth sores, nosebleeds, postnasal drip, rhinorrhea, sinus pressure, sinus pain, sneezing, sore throat, tinnitus, trouble swallowing and voice change.    Eyes:  Negative for photophobia, pain, discharge, redness, itching and visual disturbance.   Respiratory:  Negative for apnea, cough, choking, chest tightness, shortness of breath, wheezing and stridor.    Cardiovascular:  Negative for chest pain, palpitations and leg swelling.   Gastrointestinal:  Negative for abdominal distention, abdominal pain, anal bleeding, blood in stool,  constipation, diarrhea, nausea, rectal pain and vomiting.   Endocrine: Negative for cold intolerance, heat intolerance, polydipsia, polyphagia and polyuria.   Genitourinary:  Negative for decreased urine volume, difficulty urinating, dysuria, flank pain, frequency, hematuria, menstrual problem, pelvic pain, urgency, vaginal bleeding, vaginal discharge and vaginal pain.   Skin:  Negative for rash.   Neurological:  Negative for dizziness, tremors, seizures, syncope, facial asymmetry, speech difficulty, weakness, light-headedness, numbness and headaches.   Psychiatric/Behavioral:  Negative for agitation, behavioral problems, confusion, decreased concentration, dysphoric mood, hallucinations, self-injury, sleep disturbance and suicidal ideas. The patient is not nervous/anxious and is not hyperactive.    All other systems reviewed and are negative.        Current Medications[6]  Allergies:Allergies[7]    HISTORY:  Past Medical History[8]   Past Surgical History[9]   Family History[10]   Social History:   Short Social Hx on File[11]     Exercise level: exercises 4-5 times a  week (cross fit with  and weight lifting X 45 minutes 3 times a week, one day class X 1 hr. ) and has not been following low salt diet.  Weight has been up. Asthma gets worse with cold weather but not with exercise or with URI. Eats out occ. Cooks 50% of time.   Wt Readings from Last 3 Encounters:   04/10/25 195 lb (88.5 kg)   09/11/24 180 lb (81.6 kg)   07/01/24 188 lb 12.8 oz (85.6 kg)     BP Readings from Last 3 Encounters:   04/10/25 137/84   09/13/24 101/74   07/01/24 132/88       Labs:   Lab Results   Component Value Date/Time     (H) 08/16/2024 07:54 AM     08/16/2024 07:54 AM    K 4.4 08/16/2024 07:54 AM     08/16/2024 07:54 AM    CO2 24.0 08/16/2024 07:54 AM    CREATSERUM 0.86 08/16/2024 07:54 AM    CA 9.5 08/16/2024 07:54 AM    AST 29 08/16/2024 07:54 AM    ALT 27 08/16/2024 07:54 AM    TSH 3.368 08/16/2024 07:54 AM     T4F 1.1 08/16/2024 07:54 AM        Lab Results   Component Value Date/Time    CHOLEST 140 11/15/2024 08:04 AM    HDL 61 (H) 11/15/2024 08:04 AM    TRIG 52 11/15/2024 08:04 AM    LDL 68 11/15/2024 08:04 AM    NONHDLC 79 11/15/2024 08:04 AM          PHYSICAL EXAM:   /84 (BP Location: Right arm, Patient Position: Sitting, Cuff Size: large)   Pulse 94   Temp 97.3 °F (36.3 °C) (Temporal)   Ht 5' 4\" (1.626 m)   Wt 195 lb (88.5 kg)   LMP 06/23/2024 (Exact Date)   SpO2 100%   BMI 33.47 kg/m²   BP Readings from Last 3 Encounters:   04/10/25 137/84   09/13/24 101/74   07/01/24 132/88     Wt Readings from Last 3 Encounters:   04/10/25 195 lb (88.5 kg)   09/11/24 180 lb (81.6 kg)   07/01/24 188 lb 12.8 oz (85.6 kg)       Physical Exam  Vitals and nursing note reviewed.   Constitutional:       General: She is not in acute distress.     Appearance: Normal appearance. She is well-developed and well-groomed. She is not ill-appearing, toxic-appearing or diaphoretic.      Interventions: She is not intubated.  HENT:      Head: Normocephalic and atraumatic.      Right Ear: Tympanic membrane, ear canal and external ear normal. No decreased hearing noted. No laceration, drainage, swelling or tenderness. No middle ear effusion. There is no impacted cerumen. No foreign body. No mastoid tenderness. No PE tube. No hemotympanum. Tympanic membrane is not injected, scarred, perforated, erythematous, retracted or bulging. Tympanic membrane has normal mobility.      Left Ear: Tympanic membrane, ear canal and external ear normal. No decreased hearing noted. No laceration, drainage, swelling or tenderness.  No middle ear effusion. There is no impacted cerumen. No foreign body. No mastoid tenderness. No PE tube. No hemotympanum. Tympanic membrane is not injected, scarred, perforated, erythematous, retracted or bulging. Tympanic membrane has normal mobility.      Nose:      Right Sinus: No maxillary sinus tenderness or frontal sinus  tenderness.      Left Sinus: No maxillary sinus tenderness or frontal sinus tenderness.      Mouth/Throat:      Lips: Pink. No lesions.      Mouth: Mucous membranes are moist. No injury, lacerations, oral lesions or angioedema.      Dentition: No dental tenderness, gingival swelling, dental abscesses or gum lesions.      Tongue: No lesions. Tongue does not deviate from midline.      Palate: No mass and lesions.      Pharynx: Oropharynx is clear. Uvula midline. No pharyngeal swelling, oropharyngeal exudate, posterior oropharyngeal erythema or uvula swelling.      Tonsils: No tonsillar exudate or tonsillar abscesses.   Eyes:      General: Lids are normal. Gaze aligned appropriately. No scleral icterus.        Right eye: No foreign body, discharge or hordeolum.         Left eye: No foreign body, discharge or hordeolum.      Extraocular Movements: Extraocular movements intact.      Right eye: Normal extraocular motion and no nystagmus.      Left eye: Normal extraocular motion and no nystagmus.      Conjunctiva/sclera: Conjunctivae normal.      Right eye: Right conjunctiva is not injected. No chemosis, exudate or hemorrhage.     Left eye: Left conjunctiva is not injected. No chemosis, exudate or hemorrhage.     Pupils: Pupils are equal, round, and reactive to light.   Neck:      Thyroid: No thyroid mass, thyromegaly or thyroid tenderness.      Vascular: Normal carotid pulses. No carotid bruit, hepatojugular reflux or JVD.      Trachea: Trachea and phonation normal. No tracheal tenderness, tracheostomy, abnormal tracheal secretions or tracheal deviation.   Cardiovascular:      Rate and Rhythm: Normal rate and regular rhythm.      Pulses: Normal pulses.           Carotid pulses are 2+ on the right side and 2+ on the left side.       Radial pulses are 2+ on the right side and 2+ on the left side.        Dorsalis pedis pulses are 2+ on the right side and 2+ on the left side.        Posterior tibial pulses are 2+ on the right  side and 2+ on the left side.      Heart sounds: Normal heart sounds, S1 normal and S2 normal.   Pulmonary:      Effort: Pulmonary effort is normal. No tachypnea, bradypnea, accessory muscle usage, prolonged expiration, respiratory distress or retractions. She is not intubated.      Breath sounds: Normal breath sounds and air entry. No stridor, decreased air movement or transmitted upper airway sounds. No decreased breath sounds, wheezing, rhonchi or rales.   Chest:      Chest wall: No tenderness.      Comments: Breast exam: deferred by pt. Due to done by breast clinic and gyne.   Abdominal:      General: Bowel sounds are normal. There is no distension.      Palpations: Abdomen is soft. Abdomen is not rigid. There is no fluid wave, hepatomegaly, splenomegaly or mass.      Tenderness: There is no abdominal tenderness. There is no right CVA tenderness, left CVA tenderness, guarding or rebound.   Musculoskeletal:      Cervical back: Neck supple. No tenderness.      Right lower leg: No edema.      Left lower leg: No edema.   Lymphadenopathy:      Head:      Right side of head: No submental, submandibular, preauricular, posterior auricular or occipital adenopathy.      Left side of head: No submental, submandibular, preauricular, posterior auricular or occipital adenopathy.      Cervical: No cervical adenopathy.      Right cervical: No superficial, deep or posterior cervical adenopathy.     Left cervical: No superficial, deep or posterior cervical adenopathy.      Upper Body:      Right upper body: No supraclavicular adenopathy.      Left upper body: No supraclavicular adenopathy.   Skin:     General: Skin is warm and dry.      Coloration: Skin is not pale.      Findings: No erythema or rash.   Neurological:      Mental Status: She is alert and oriented to person, place, and time.   Psychiatric:         Mood and Affect: Mood normal.         Speech: Speech normal.         Behavior: Behavior normal. Behavior is  cooperative.              ASSESSMENT/PLAN:     Encounter Diagnoses   Name Primary?    Acquired hallux valgus of right foot Stable.    Yes    Mild intermittent asthma without complication (HCC) Stable. FU allergy.        Breast cancer screening, high risk patient Normal mammogram.  Has been staggering mammograms with MRIs of the breast every 6 months.  Continue self breast exam every month.   high risk breast clinic.        Capsulitis of metatarsophalangeal (MTP) joint of right foot Stable.        Family history of malignant neoplasm of breast Fu breast clinic.        Hyperglycemia Check blood.        Mixed hyperlipidemia Check blood.        Myopia, unspecified laterality Stable.        Pap smear for cervical cancer screening Up to date. Fu gyne.        Physical exam, annual Check urine.        Thyroid nodule Check blood.        Vaccine counseling Tdap and PCV 20 today. Recc. Covid booster. Can get at local pharmacy or [facility] office.        Vitamin D deficiency Check blood.         Orders Placed This Encounter   Procedures    Lipid Panel    Comp Metabolic Panel (14)    Hemoglobin A1C    Vitamin D    TSH W Reflex To Free T4    Free T4, (Free Thyroxine)    URINALYSIS, AUTO, W/O SCOPE    Prevnar 20 (PCV20) [11680]    TETANUS, DIPHTHERIA TOXOIDS AND ACELLULAR PERTUSIS VACCINE (TDAP), >7 YEARS, IM USE       Meds This Visit:  Requested Prescriptions      No prescriptions requested or ordered in this encounter       Imaging & Referrals:  PCV20 VACCINE FOR INTRAMUSCULAR USE  TETANUS, DIPHTHERIA TOXOIDS AND ACELLULAR PERTUSIS VACCINE (TDAP), >7 YEARS, IM USE      RTC 1 yr. for physical.          [1]   Current Outpatient Medications   Medication Sig Dispense Refill    atorvastatin 20 MG Oral Tab Take 1 tablet (20 mg total) by mouth nightly. 90 tablet 1    levocetirizine 5 MG Oral Tab TAKE 1 TABLET(5 MG) BY MOUTH EVERY EVENING 90 tablet 0    fluticasone-salmeterol 115-21 MCG/ACT Inhalation Aerosol Inhale 1 puff  into the lungs 2 (two) times daily. 8 g 0    albuterol (PROAIR HFA) 108 (90 Base) MCG/ACT Inhalation Aero Soln Inhale 2 puffs into the lungs every 6 (six) hours as needed for Wheezing. 1 each 0    montelukast 10 MG Oral Tab Take 1 tablet (10 mg total) by mouth nightly. 90 tablet 2    Mometasone Furoate (NASONEX) 50 MCG/ACT Nasal Suspension 2 sprays by Nasal route daily. 3 Inhaler 2    Multiple Vitamins-Minerals (MULTIVITAMIN ADULT OR) Take by mouth daily.     [2]   Past Medical History:   Asthma (HCC)    Dysplasia of cervix    Mononucleosis    SEASONAL ALLERGIES    Seizure disorder (HCC)    infantile treated to age 3    Thyroid nodule    teenager, suppressive treatment    Vocal cord cyst    [place] teeth extracted   [3]   Past Surgical History:  Procedure Laterality Date    Carpal tunnel release      Colonoscopy N/A 9/13/2024    Procedure: COLONOSCOPY;  Surgeon: Luz Baxter MD;  Location: [facility] ENDO    Colposcopy,bx cervix/endocerv curr      Other surgical history      Vocal cord cyst removal    Tonsillectomy  1988   [4]   Family History  Problem Relation Age of Onset    Allergies Mother     Other (osteoporosis.) Mother     Cancer Father 65        prostate ca, prostatectomy    Hypertension Father     Breast Cancer Maternal Grandmother 52        d.54    Stroke Maternal Grandfather     Heart Attack Maternal Grandfather     Cancer Maternal Grandfather 52        bladder ca; non-smoker; ; d.90    Stroke Paternal Grandmother     Breast Cancer Maternal Aunt 42        BRCA1/2 neg in 2001    Breast Cancer Maternal Aunt 51        others dx 60,63    Cancer Maternal Uncle 64        bladder ca; non-smoker    Breast Cancer Maternal Cousin Female 50        others dx 54, 56   [5]   Social History  Socioeconomic History    Marital status:    Tobacco Use    Smoking status: Never    Smokeless tobacco: Never    Tobacco comments:     No household smokers.    Vaping Use    Vaping status: Never Used    Substance and Sexual Activity    Alcohol use: Yes     Comment: socially    Drug use: No   Other Topics Concern    Caffeine Concern Yes     Comment: Coffee, soda     Reaction to local anesthetic No    Pt has a pacemaker No    Pt has a defibrillator No   [6]   Current Outpatient Medications   Medication Sig Dispense Refill    atorvastatin 20 MG Oral Tab Take 1 tablet (20 mg total) by mouth nightly. 90 tablet 1    levocetirizine 5 MG Oral Tab TAKE 1 TABLET(5 MG) BY MOUTH EVERY EVENING 90 tablet 0    fluticasone-salmeterol 115-21 MCG/ACT Inhalation Aerosol Inhale 1 puff into the lungs 2 (two) times daily. 8 g 0    albuterol (PROAIR HFA) 108 (90 Base) MCG/ACT Inhalation Aero Soln Inhale 2 puffs into the lungs every 6 (six) hours as needed for Wheezing. 1 each 0    montelukast 10 MG Oral Tab Take 1 tablet (10 mg total) by mouth nightly. 90 tablet 2    Mometasone Furoate (NASONEX) 50 MCG/ACT Nasal Suspension 2 sprays by Nasal route daily. 3 Inhaler 2    Multiple Vitamins-Minerals (MULTIVITAMIN ADULT OR) Take by mouth daily.     [7]   Allergies  Allergen Reactions    Pcn [Penicillins]     Sulfa Antibiotics ANXIETY   [8]   Past Medical History:   Asthma (HCC)    Dysplasia of cervix    Mononucleosis    SEASONAL ALLERGIES    Seizure disorder (HCC)    infantile treated to age 3    Thyroid nodule    teenager, suppressive treatment    Vocal cord cyst    [place] teeth extracted   [9]   Past Surgical History:  Procedure Laterality Date    Carpal tunnel release      Colonoscopy N/A 9/13/2024    Procedure: COLONOSCOPY;  Surgeon: Luz Baxter MD;  Location: [facility] ENDO    Colposcopy,bx cervix/endocerv curr      Other surgical history      Vocal cord cyst removal    Tonsillectomy  1988   [10]   Family History  Problem Relation Age of Onset    Allergies Mother     Other (osteoporosis.) Mother     Cancer Father 65        prostate ca, prostatectomy    Hypertension Father     Breast Cancer Maternal Grandmother 52         d.54    Stroke Maternal Grandfather     Heart Attack Maternal Grandfather     Cancer Maternal Grandfather 52        bladder ca; non-smoker; ; [id number]    Stroke Paternal Grandmother     Breast Cancer Maternal Aunt 42        BRCA1/2 neg in 2001    Breast Cancer Maternal Aunt 51        others dx 60,63    Cancer Maternal Uncle 64        bladder ca; non-smoker    Breast Cancer Maternal Cousin Female 50        others dx 54, 56   [11]   Social History  Socioeconomic History    Marital status:    Tobacco Use    Smoking status: Never    Smokeless tobacco: Never    Tobacco comments:     No household smokers.    Vaping Use    Vaping status: Never Used   Substance and Sexual Activity    Alcohol use: Yes     Comment: socially    Drug use: No   Other Topics Concern    Caffeine Concern Yes     Comment: Coffee, soda     Reaction to local anesthetic No    Pt has a pacemaker No    Pt has a defibrillator No

## 2025-04-14 ENCOUNTER — LAB ENCOUNTER (OUTPATIENT)
Dept: LAB | Facility: HOSPITAL | Age: 45
End: 2025-04-14
Attending: INTERNAL MEDICINE
Payer: COMMERCIAL

## 2025-04-14 DIAGNOSIS — R73.9 HYPERGLYCEMIA: ICD-10-CM

## 2025-04-14 DIAGNOSIS — E78.2 MIXED HYPERLIPIDEMIA: ICD-10-CM

## 2025-04-14 DIAGNOSIS — E04.1 THYROID NODULE: ICD-10-CM

## 2025-04-14 LAB
ALBUMIN SERPL-MCNC: 4.8 G/DL (ref 3.2–4.8)
ALBUMIN/GLOB SERPL: 2.1 {RATIO} (ref 1–2)
ALP LIVER SERPL-CCNC: 61 U/L (ref 37–98)
ALT SERPL-CCNC: 35 U/L (ref 10–49)
ANION GAP SERPL CALC-SCNC: 7 MMOL/L (ref 0–18)
AST SERPL-CCNC: 23 U/L (ref ?–34)
BILIRUB SERPL-MCNC: 0.8 MG/DL (ref 0.3–1.2)
BUN BLD-MCNC: 12 MG/DL (ref 9–23)
BUN/CREAT SERPL: 12.4 (ref 10–20)
CALCIUM BLD-MCNC: 9.3 MG/DL (ref 8.7–10.4)
CHLORIDE SERPL-SCNC: 108 MMOL/L (ref 98–112)
CHOLEST SERPL-MCNC: 139 MG/DL (ref ?–200)
CO2 SERPL-SCNC: 25 MMOL/L (ref 21–32)
CREAT BLD-MCNC: 0.97 MG/DL (ref 0.55–1.02)
EGFRCR SERPLBLD CKD-EPI 2021: 73 ML/MIN/1.73M2 (ref 60–?)
EST. AVERAGE GLUCOSE BLD GHB EST-MCNC: 111 MG/DL (ref 68–126)
FASTING PATIENT LIPID ANSWER: YES
FASTING STATUS PATIENT QL REPORTED: YES
GLOBULIN PLAS-MCNC: 2.3 G/DL (ref 2–3.5)
GLUCOSE BLD-MCNC: 124 MG/DL (ref 70–99)
HBA1C MFR BLD: 5.5 % (ref ?–5.7)
HDLC SERPL-MCNC: 60 MG/DL (ref 40–59)
LDLC SERPL CALC-MCNC: 65 MG/DL (ref ?–100)
NONHDLC SERPL-MCNC: 79 MG/DL (ref ?–130)
OSMOLALITY SERPL CALC.SUM OF ELEC: 291 MOSM/KG (ref 275–295)
POTASSIUM SERPL-SCNC: 4.7 MMOL/L (ref 3.5–5.1)
PROT SERPL-MCNC: 7.1 G/DL (ref 5.7–8.2)
SODIUM SERPL-SCNC: 140 MMOL/L (ref 136–145)
T4 FREE SERPL-MCNC: 1.2 NG/DL (ref 0.8–1.7)
TRIGL SERPL-MCNC: 67 MG/DL (ref 30–149)
TSI SER-ACNC: 3.74 UIU/ML (ref 0.55–4.78)
VIT D+METAB SERPL-MCNC: 43.1 NG/ML (ref 30–100)
VLDLC SERPL CALC-MCNC: 10 MG/DL (ref 0–30)

## 2025-04-14 PROCEDURE — 84439 ASSAY OF FREE THYROXINE: CPT

## 2025-04-14 PROCEDURE — 36415 COLL VENOUS BLD VENIPUNCTURE: CPT

## 2025-04-14 PROCEDURE — 84443 ASSAY THYROID STIM HORMONE: CPT

## 2025-04-14 PROCEDURE — 83036 HEMOGLOBIN GLYCOSYLATED A1C: CPT

## 2025-04-14 PROCEDURE — 80061 LIPID PANEL: CPT

## 2025-04-14 PROCEDURE — 80053 COMPREHEN METABOLIC PANEL: CPT

## 2025-04-14 PROCEDURE — 82306 VITAMIN D 25 HYDROXY: CPT | Performed by: INTERNAL MEDICINE

## 2025-04-14 NOTE — PROGRESS NOTES
CMP Normal (electrolytes, kidney and liver functions),   Lipid (choilesterol) is good,   Thyroid is good.   Hemoglobin A1c which is a 3-month average of sugars looks good.  Goal is 6.5 or less.  But careful starting to creep up a little bit.

## 2025-05-06 RX ORDER — LEVOCETIRIZINE DIHYDROCHLORIDE 5 MG/1
5 TABLET, FILM COATED ORAL EVERY EVENING
Qty: 90 TABLET | Refills: 0 | Status: SHIPPED | OUTPATIENT
Start: 2025-05-06

## 2025-05-06 NOTE — TELEPHONE ENCOUNTER
Refill requested for    Name from pharmacy: LEVOCETIRIZINE 5MG TABLETS         Will file in chart as: LEVOCETIRIZINE 5 MG Oral Tab    Sig: TAKE 1 TABLET(5 MG) BY MOUTH EVERY EVENING    Disp: 90 tablet    Refills: 0 (Pharmacy requested: Not specified)    Start: 5/6/2025    Class: Normal    Non-formulary    Last ordered: 3 months ago (2/4/2025) by Rashad Balderas MD    Last refill: 2/4/2025    Rx #: [id number]    Antihistamines Passed 05/06/2025 05:32 AM   Protocol Details Appt in past 12 mos or next 1 mos    Medication is active on med list      To be filled at: [id number] - [place], [place] - [address] AT [facility], [phone], [phone]       Last office visit: 05/18/2024    Previously advised to follow up in one year     F/U currently scheduled? Yes, 7/14/2025        ACTION: Refilled per protocol.

## 2025-05-08 RX ORDER — MONTELUKAST SODIUM 10 MG/1
10 TABLET ORAL NIGHTLY
Qty: 90 TABLET | Refills: 0 | Status: SHIPPED | OUTPATIENT
Start: 2025-05-08

## 2025-05-08 NOTE — TELEPHONE ENCOUNTER
Received a refill request for Singulair 10 mg- 1 tablet by mouth daily.    Last office visit was 5/18/24 for allergies and asthma.    Patient has an appointment scheduled for a follow up with Dr. Balderas on 7/14/25.    Refill meets protocol- refilled.

## 2025-07-14 ENCOUNTER — OFFICE VISIT (OUTPATIENT)
Dept: ALLERGY | Facility: CLINIC | Age: 45
End: 2025-07-14

## 2025-07-14 DIAGNOSIS — H10.10 SEASONAL AND PERENNIAL ALLERGIC RHINOCONJUNCTIVITIS: ICD-10-CM

## 2025-07-14 DIAGNOSIS — L50.5 CHOLINERGIC URTICARIA: ICD-10-CM

## 2025-07-14 DIAGNOSIS — J30.89 SEASONAL AND PERENNIAL ALLERGIC RHINOCONJUNCTIVITIS: ICD-10-CM

## 2025-07-14 DIAGNOSIS — J45.20 RAD (REACTIVE AIRWAY DISEASE), MILD INTERMITTENT, UNCOMPLICATED (HCC): ICD-10-CM

## 2025-07-14 DIAGNOSIS — J30.2 SEASONAL AND PERENNIAL ALLERGIC RHINOCONJUNCTIVITIS: ICD-10-CM

## 2025-07-14 DIAGNOSIS — Z23 FLU VACCINE NEED: ICD-10-CM

## 2025-07-14 DIAGNOSIS — L50.1 CHRONIC IDIOPATHIC URTICARIA: Primary | ICD-10-CM

## 2025-07-14 DIAGNOSIS — Z88.0 H/O PENICILLIN-TYPE ANTIBIOTIC ALLERGY: ICD-10-CM

## 2025-07-14 DIAGNOSIS — Z23 NEED FOR COVID-19 VACCINE: ICD-10-CM

## 2025-07-14 PROCEDURE — 99214 OFFICE O/P EST MOD 30 MIN: CPT | Performed by: ALLERGY & IMMUNOLOGY

## 2025-07-14 RX ORDER — LEVOCETIRIZINE DIHYDROCHLORIDE 5 MG/1
5 TABLET, FILM COATED ORAL 2 TIMES DAILY
Qty: 180 TABLET | Refills: 2 | Status: SHIPPED | OUTPATIENT
Start: 2025-07-14

## 2025-07-14 RX ORDER — MONTELUKAST SODIUM 10 MG/1
10 TABLET ORAL NIGHTLY
Qty: 90 TABLET | Refills: 2 | Status: SHIPPED | OUTPATIENT
Start: 2025-07-14

## 2025-07-14 NOTE — PROGRESS NOTES
Melissa Anderson is a 45 year old female.    HPI:     Chief Complaint   Patient presents with    Hives     Patient presents for follow up for hives which come and go, states when she has hives takes Xyzal 2 times a days which help control the hives.     Patient is a 45-year-old female who presents for follow-up with a chief complaint of allergies and hives  Patient last seen by me on May 18, 2024  History of chronic idiopathic urticaria with dermatographia  Also with history of asthma mild intermittent allergic rhinitis and penicillin allergy  Medication list include Singulair Xyzal Advair 115/21 albuterol Nasonex  Prior serum IgE testing to environmental allergens was unremarkable  At last visit 1 dog in the home    Immunizations reviewed  Today patient reports  History of Present Illness  Thomas Anderson is a 45 year old female with chronic hives who presents for follow-up on dermatographic urticaria management.    She experiences ongoing issues with dermatographic urticaria and takes Xyzal twice daily. Her skin becomes reactive, particularly in response to heat and during the spring when cottonwood pollen is prevalent. Symptoms increase in the summer. Heat and cottonwood pollen are significant triggers.    She has not experienced nasal or sinus allergies or asthma symptoms recently. No emergency room visits, use of prednisone, or albuterol more than twice a week. She has not used her albuterol inhaler in several years and does not feel the need for it. She exercises intensely without experiencing asthma symptoms. She previously used Advair for a post-infectious cough last summer, which resolved within a week.    Her current medications include Xyzal twice daily and montelukast once at night. She has an albuterol inhaler at home but has not used it in years.    She has a family history of colon cancer and had her last colonoscopy in the past year. She is considering an oral challenge to test for penicillin  allergy but has been unable to stop antihistamines for the test due to her symptoms.         HISTORY:  Past Medical History[1]   Past Surgical History[2]   Family History[3]   Social History: Short Social Hx on File[4]     Medications (Active prior to today's visit):  Current Medications[5]    Allergies:  Allergies[6]      ROS:   Allergic/Immuno:  See hpi  Cardiovascular:  Negative for irregular heartbeat/palpitations, chest pain, edema  Constitutional:  Negative night sweats,weight loss, irritability and lethargy  ENMT:  Negative for ear drainage, hearing loss and nasal drainage  Eyes:  Negative for eye discharge and vision loss  Gastrointestinal:  Negative for abdominal pain, diarrhea and vomiting  Integumentary:  Negative for pruritus and rash  Respiratory:  Negative for cough, dyspnea and wheezing    PHYSICAL EXAM:   Constitutional: responsive, no acute distress noted  Head/Face: NC/Atraumatic  Eyes/Vision: conjunctiva and lids are normal extraocular motion is intact   Ears/Audiometry: tympanic membranes are normal bilaterally hearing is grossly intact  Nose/Mouth/Throat: nose and throat are clear mucous membranes are moist   Neck/Thyroid: neck is supple without adenopathy  Lymphatic: no abnormal cervical, supraclavicular or axillary adenopathy is noted  Respiratory: normal to inspection lungs are clear to auscultation bilaterally normal respiratory effort   Cardiovascular: regular rate and rhythm no murmurs, gallups, or rubs  Abdomen: soft non-tender non-distended  Skin/Hair: no unusual rashes present   Extremities: no edema, cyanosis, or clubbing     ASSESSMENT/PLAN:   Assessment   Encounter Diagnoses   Name Primary?    Chronic idiopathic urticaria Yes    Cholinergic urticaria     RAD (reactive airway disease), mild intermittent, uncomplicated (HCC)     Seasonal and perennial allergic rhinoconjunctivitis     H/O penicillin-type antibiotic allergy     Need for COVID-19 vaccine     Flu vaccine need         Assessment & Plan  Dermatographic Urticaria  Chronic dermatographic urticaria, well-controlled with Xyzal (levocetirizine) twice daily. Symptoms are more pronounced in the summer and during exposure to cottonwood pollen. No significant nasal or sinus allergies or asthma symptoms reported. No recent emergency room visits or need for albuterol or prednisone. The condition is primarily triggered by physical factors such as heat and cottonwood pollen rather than allergens. She is aware of the potential for other physical triggers and is managing symptoms effectively with current medication regimen.  - Refill Xyzal (levocetirizine) for one year.  - Refill montelukast for one year.  - Advise to increase Xyzal up to three to four times per day if hives worsen.  - Discuss potential use of Xolair if symptoms become refractory, but not needed currently.  - Monitor for any side effects of montelukast, such as mood changes, and advise to discontinue if they occur.  - Hold off on albuterol refill unless needed.    Penicillin Allergy Testing  Desire to test for penicillin allergy, but unable to discontinue antihistamines due to dermatographic urticaria. Previous mild reaction to sulfa drugs noted, but no recent issues. Blood test negative for penicillin allergy, with a high likelihood of outgrowing previous reaction. Discussed that 80% of people outgrow their prior reaction after ten years, increasing to 95% with a negative blood test.  - Consider scheduling an oral penicillin challenge while on antihistamines.  - Plan to schedule the oral challenge in the winter.  - Advise to attempt 24-hour discontinuation of antihistamines prior to the challenge if possible.  - Coordinate with the nurse to schedule the oral challenge.    Respiratory Infection with Post-Infectious Cough  Previous respiratory infection with post-infectious cough last summer, treated with Advair (fluticasone/salmeterol) for a short duration. No current  symptoms of asthma or need for Advair or albuterol. Engages in intense physical activity without respiratory issues.  - No current need for Advair or albuterol.  - Advise to contact the office if symptoms of asthma develop or if albuterol is needed.      Recommend flu vaccine in the fall  Consider COVID-vaccine booster in the fall         Orders This Visit:  No orders of the defined types were placed in this encounter.      Meds This Visit:  Requested Prescriptions     Signed Prescriptions Disp Refills    levocetirizine 5 MG Oral Tab 180 tablet 2     Sig: Take 1 tablet (5 mg total) by mouth in the morning and 1 tablet (5 mg total) before bedtime.    montelukast (SINGULAIR) 10 MG Oral Tab 90 tablet 2     Sig: Take 1 tablet (10 mg total) by mouth nightly.       Imaging & Referrals:  None     7/14/2025  Rashad Balderas MD    If medication samples were provided today, they were provided solely for patient education and training related to self administration of these medications.  Teaching, instruction and sample was provided to the patient by myself.  Teaching included  a review of potential adverse side effects as well as potential efficacy.  Patient's questions were answered in regards to medication administration and dosing and potential side effects. Teaching was provided via the teach back method           [1]   Past Medical History:   Asthma (HCC)    Dysplasia of cervix    Mononucleosis    SEASONAL ALLERGIES    Seizure disorder (HCC)    infantile treated to age 3    Thyroid nodule    teenager, suppressive treatment    Vocal cord cyst    [place] teeth extracted   [2]   Past Surgical History:  Procedure Laterality Date    Carpal tunnel release      Colonoscopy N/A 9/13/2024    Procedure: COLONOSCOPY;  Surgeon: Luz Baxter MD;  Location: [facility]    Colposcopy,bx cervix/endocerv curr      Other surgical history      Vocal cord cyst removal    Tonsillectomy  1988   [3]   Family History  Problem  Relation Age of Onset    Allergies Mother     Other (osteoporosis.) Mother     Cancer Father 65        prostate ca, prostatectomy    Hypertension Father     Breast Cancer Maternal Grandmother 52        d.54    Stroke Maternal Grandfather     Heart Attack Maternal Grandfather     Cancer Maternal Grandfather 52        bladder ca; non-smoker; ; d.90    Stroke Paternal Grandmother     Breast Cancer Maternal Aunt 42        BRCA1/2 neg in 2001    Breast Cancer Maternal Aunt 51        others dx 60,63    Cancer Maternal Uncle 64        bladder ca; non-smoker    Breast Cancer Maternal Cousin Female 50        others dx 54, 56   [4]   Social History  Socioeconomic History    Marital status:    Tobacco Use    Smoking status: Never     Passive exposure: Never    Smokeless tobacco: Never    Tobacco comments:     No household smokers.    Vaping Use    Vaping status: Never Used   Substance and Sexual Activity    Alcohol use: Yes     Comment: socially    Drug use: No   Other Topics Concern    Caffeine Concern Yes     Comment: Coffee, soda     Reaction to local anesthetic No    Pt has a pacemaker No    Pt has a defibrillator No   [5]   Current Outpatient Medications   Medication Sig Dispense Refill    levocetirizine 5 MG Oral Tab Take 1 tablet (5 mg total) by mouth in the morning and 1 tablet (5 mg total) before bedtime. 180 tablet 2    montelukast (SINGULAIR) 10 MG Oral Tab Take 1 tablet (10 mg total) by mouth nightly. 90 tablet 2    montelukast 10 MG Oral Tab TAKE 1 TABLET(10 MG) BY MOUTH EVERY NIGHT 90 tablet 0    LEVOCETIRIZINE 5 MG Oral Tab TAKE 1 TABLET(5 MG) BY MOUTH EVERY EVENING 90 tablet 0    atorvastatin 20 MG Oral Tab Take 1 tablet (20 mg total) by mouth nightly. 90 tablet 1    albuterol (PROAIR HFA) 108 (90 Base) MCG/ACT Inhalation Aero Soln Inhale 2 puffs into the lungs every 6 (six) hours as needed for Wheezing. 1 each 0    Mometasone Furoate (NASONEX) 50 MCG/ACT Nasal Suspension 2 sprays by Nasal route  daily. 3 Inhaler 2    Multiple Vitamins-Minerals (MULTIVITAMIN ADULT OR) Take by mouth daily.      fluticasone-salmeterol 115-21 MCG/ACT Inhalation Aerosol Inhale 1 puff into the lungs 2 (two) times daily. (Patient not taking: Reported on 7/14/2025) 8 g 0   [6]   Allergies  Allergen Reactions    Pcn [Penicillins]     Sulfa Antibiotics ANXIETY

## 2025-07-14 NOTE — PATIENT INSTRUCTIONS
Assessment & Plan  Dermatographic Urticaria  Chronic dermatographic urticaria, well-controlled with Xyzal (levocetirizine) twice daily. Symptoms are more pronounced in the summer and during exposure to cottonwood pollen. No significant nasal or sinus allergies or asthma symptoms reported. No recent emergency room visits or need for albuterol or prednisone. The condition is primarily triggered by physical factors such as heat and cottonwood pollen rather than allergens. She is aware of the potential for other physical triggers and is managing symptoms effectively with current medication regimen.  - Refill Xyzal (levocetirizine) for one year.  - Refill montelukast for one year.  - Advise to increase Xyzal up to three to four times per day if hives worsen.  - Discuss potential use of Xolair if symptoms become refractory, but not needed currently.  - Monitor for any side effects of montelukast, such as mood changes, and advise to discontinue if they occur.  - Hold off on albuterol refill unless needed.    Penicillin Allergy Testing  Desire to test for penicillin allergy, but unable to discontinue antihistamines due to dermatographic urticaria. Previous mild reaction to sulfa drugs noted, but no recent issues. Blood test negative for penicillin allergy, with a high likelihood of outgrowing previous reaction. Discussed that 80% of people outgrow their prior reaction after ten years, increasing to 95% with a negative blood test.  - Consider scheduling an oral penicillin challenge while on antihistamines.  - Plan to schedule the oral challenge in the winter.  - Advise to attempt 24-hour discontinuation of antihistamines prior to the challenge if possible.  - Coordinate with the nurse to schedule the oral challenge.    Respiratory Infection with Post-Infectious Cough  Previous respiratory infection with post-infectious cough last summer, treated with Advair (fluticasone/salmeterol) for a short duration. No current symptoms  of asthma or need for Advair or albuterol. Engages in intense physical activity without respiratory issues.  - No current need for Advair or albuterol.  - Advise to contact the office if symptoms of asthma develop or if albuterol is needed.      Recommend flu vaccine in the fall  Consider COVID-vaccine booster in the fall         Orders This Visit:  No orders of the defined types were placed in this encounter.      Meds This Visit:  Requested Prescriptions     Signed Prescriptions Disp Refills    levocetirizine 5 MG Oral Tab 180 tablet 2     Sig: Take 1 tablet (5 mg total) by mouth in the morning and 1 tablet (5 mg total) before bedtime.    montelukast (SINGULAIR) 10 MG Oral Tab 90 tablet 2     Sig: Take 1 tablet (10 mg total) by mouth nightly.

## 2025-07-14 NOTE — PROGRESS NOTES
The following individual(s) verbally consented to be recorded using ambient AI listening technology and understand that they can each withdraw their consent to this listening technology at any point by asking the clinician to turn off or pause the recording:    Patient name: Melissa CHARNAJIT Anderson

## 2025-07-18 ENCOUNTER — TELEMEDICINE (OUTPATIENT)
Dept: INTERNAL MEDICINE CLINIC | Facility: CLINIC | Age: 45
End: 2025-07-18
Payer: COMMERCIAL

## 2025-07-18 ENCOUNTER — OFFICE VISIT (OUTPATIENT)
Dept: DERMATOLOGY CLINIC | Facility: CLINIC | Age: 45
End: 2025-07-18

## 2025-07-18 DIAGNOSIS — M25.561 ACUTE PAIN OF RIGHT KNEE: ICD-10-CM

## 2025-07-18 DIAGNOSIS — M76.52 PATELLAR TENDINITIS OF LEFT KNEE: ICD-10-CM

## 2025-07-18 DIAGNOSIS — D23.9 BENIGN NEOPLASM OF SKIN, UNSPECIFIED LOCATION: ICD-10-CM

## 2025-07-18 DIAGNOSIS — D18.01 HEMANGIOMA OF SKIN: ICD-10-CM

## 2025-07-18 DIAGNOSIS — L81.4 LENTIGO: ICD-10-CM

## 2025-07-18 DIAGNOSIS — L81.3 SPOT, CAFE-AU-LAIT: ICD-10-CM

## 2025-07-18 DIAGNOSIS — M25.569 KNEE PAIN, UNSPECIFIED CHRONICITY, UNSPECIFIED LATERALITY: ICD-10-CM

## 2025-07-18 DIAGNOSIS — L82.1 SK (SEBORRHEIC KERATOSIS): ICD-10-CM

## 2025-07-18 DIAGNOSIS — Z71.85 VACCINE COUNSELING: ICD-10-CM

## 2025-07-18 DIAGNOSIS — E55.9 VITAMIN D DEFICIENCY: ICD-10-CM

## 2025-07-18 DIAGNOSIS — D22.9 MULTIPLE NEVI: Primary | ICD-10-CM

## 2025-07-18 DIAGNOSIS — M22.41 CHONDROMALACIA OF RIGHT PATELLOFEMORAL JOINT: ICD-10-CM

## 2025-07-18 DIAGNOSIS — E78.2 MIXED HYPERLIPIDEMIA: Primary | ICD-10-CM

## 2025-07-18 DIAGNOSIS — L91.8 INFLAMED ACROCHORDON: ICD-10-CM

## 2025-07-18 PROCEDURE — 99213 OFFICE O/P EST LOW 20 MIN: CPT | Performed by: DERMATOLOGY

## 2025-07-18 NOTE — PROGRESS NOTES
Virtual Telephone Check-In    Melissa Anderson verbally consents to a Virtual [facility] Telephone Check-In visit on 07/18/25.    Patient understands and accepts financial responsibility for any deductible, co-insurance and/or co-pays associated with this service.    Dr. Marie's location is at [facility].  The patient's location is at home in [place] and their identity has been established.   The patient understands that we are limiting office visits due to the COVID-19 pandemic and was informed that consent to treat includes permission to submit charges to insurance. It was also shared that without being seen and evaluated in person, there is a risk that the information and/or assessment may be incomplete or inaccurate.    Duration of the service: 4  : 58    PM -  5 :  28  PM.     Past Medical History:    Asthma (HCC)    Dysplasia of cervix    Mononucleosis    SEASONAL ALLERGIES    Seizure disorder (HCC)    infantile treated to age 3    Thyroid nodule    teenager, suppressive treatment    Vocal cord cyst    [place] teeth extracted     Allergies   Allergen Reactions    Pcn [Penicillins]     Sulfa Antibiotics ANXIETY       Current Outpatient Medications:     levocetirizine 5 MG Oral Tab, Take 1 tablet (5 mg total) by mouth in the morning and 1 tablet (5 mg total) before bedtime., Disp: 180 tablet, Rfl: 2    montelukast (SINGULAIR) 10 MG Oral Tab, Take 1 tablet (10 mg total) by mouth nightly., Disp: 90 tablet, Rfl: 2    montelukast 10 MG Oral Tab, TAKE 1 TABLET(10 MG) BY MOUTH EVERY NIGHT, Disp: 90 tablet, Rfl: 0    LEVOCETIRIZINE 5 MG Oral Tab, TAKE 1 TABLET(5 MG) BY MOUTH EVERY EVENING, Disp: 90 tablet, Rfl: 0    atorvastatin 20 MG Oral Tab, Take 1 tablet (20 mg total) by mouth nightly., Disp: 90 tablet, Rfl: 1    fluticasone-salmeterol 115-21 MCG/ACT Inhalation Aerosol, Inhale 1 puff into the lungs 2 (two) times daily. (Patient not taking: Reported on 7/14/2025), Disp: 8 g, Rfl: 0    albuterol (PROAIR HFA) 108 (90  Base) MCG/ACT Inhalation Aero Soln, Inhale 2 puffs into the lungs every 6 (six) hours as needed for Wheezing., Disp: 1 each, Rfl: 0    Mometasone Furoate (NASONEX) 50 MCG/ACT Nasal Suspension, 2 sprays by Nasal route daily., Disp: 3 Inhaler, Rfl: 2    Multiple Vitamins-Minerals (MULTIVITAMIN ADULT OR), Take by mouth daily., Disp: , Rfl:     Summary of topics discussed: I, Dr. Marie, spoke with pt.     R knee pain X 2 weeks. Competitive runner in the past. No injury. No redness nor warmth, n F,C. > swelling. Not lock nor give out.     Review of Systems:    General: Denies fatigue, chills/fever, night sweats, weight loss, loss of appetite  Neurological: Denies frequent headaches  Cardiovascular: Denies leg swelling, chest pain or pressure after eating or when upset, irregular heart rate/palpitations, dizziness, N,V, exertional arm pain nor neck pain  Patient is alert and oriented x3.  Able to speak in full sentence without short of breath. Alert and oriented on phone, no SOB or wheezing, not anxious  & MS sharp.    Diagnoses and all orders for this visit:    Mixed hyperlipidemia Stable.     Vitamin D deficiency Stable.     Vaccine counseling Holding covid booster.     Acute pain of right knee possible osteoarthritis.  Less likely meniscal tear.  Icing.  Knee brace with prolonged activity.  Try Motrin 200 mg x 2 every 12 hours with food for 7 days.  If not better follow-up with orthopedics.  Imaging now per patient.  -     Cancel: ORTHOPEDIC - INTERNAL  -     Cancel: ORTHOPEDIC - INTERNAL  -     ORTHOPEDIC - INTERNAL  -     ORTHOPEDIC - INTERNAL  -     ORTHOPEDIC - INTERNAL      Pt. aware to schedule follow up with [facility] and doing this telephone visit now due to current covid situation.     Please note that the following visit was completed using a 2 way real-time interactive audio and video communication.  This has been done in good chirag to provide continuity of care in the best interest of the patient provider  relationship, due to ongoing public health crisis, National emergency and because of restrictions of visitation.  There are limitations visit as no physical exam could be performed.  Every conscious effort was taken to allow for sufficient and adequate time.  This billing was spent on reviewing labs, medications, radiological test and decision making.  Appropriate medical decision making and tests are ordered as detailed in the plan of care above.    Amy Marie MD

## 2025-07-18 NOTE — PROGRESS NOTES
The following individual(s) verbally consented to be recorded using ambient AI listening technology and understand that they can each withdraw their consent to this listening technology at any point by asking the clinician to turn off or pause the recording:    Patient name: Melissa CHARANJIT Anderson

## 2025-07-20 PROBLEM — J32.2 CHRONIC ETHMOIDAL SINUSITIS: Status: ACTIVE | Noted: 2025-07-20

## 2025-07-20 PROBLEM — J30.2 SEASONAL ALLERGIC RHINITIS: Status: ACTIVE | Noted: 2025-07-20

## 2025-07-20 PROBLEM — J34.3 HYPERTROPHY OF NASAL TURBINATES: Status: ACTIVE | Noted: 2025-07-20

## 2025-07-20 PROBLEM — J34.2 DEVIATED NASAL SEPTUM: Status: ACTIVE | Noted: 2025-07-20

## 2025-07-27 NOTE — PROGRESS NOTES
Melissa Anderson is a 45 year old female.  HPI:     CC:    Chief Complaint   Patient presents with    Full Skin Exam     LOV 07/17/24. Pt presents for FBSE.   Pt has (1) small skin tag on upper inner left thigh F/U on lesion on rt cheek.     Pt denies personal/family hx of Skin Ca           HISTORY:    Past Medical History:    Asthma (HCC)    Dysplasia of cervix    Mononucleosis    Seasonal allergic rhinitis    SEASONAL ALLERGIES    Seizure disorder (HCC)    infantile treated to age 3    Thyroid nodule    teenager, suppressive treatment    Vocal cord cyst    [place] teeth extracted      Past Surgical History:   Procedure Laterality Date    Carpal tunnel release      Colonoscopy N/A 9/13/2024    Procedure: COLONOSCOPY;  Surgeon: Luz Baxter MD;  Location: [facility] ENDO    Colposcopy,bx cervix/endocerv curr      Other surgical history      Vocal cord cyst removal    Tonsillectomy  1988      Family History   Problem Relation Age of Onset    Allergies Mother     Other (osteoporosis.) Mother     Cancer Father 65        prostate ca, prostatectomy    Hypertension Father     Breast Cancer Maternal Grandmother 52        d.54    Stroke Maternal Grandfather     Heart Attack Maternal Grandfather     Cancer Maternal Grandfather 52        bladder ca; non-smoker; ; d.90    Stroke Paternal Grandmother     Breast Cancer Maternal Aunt 42        BRCA1/2 neg in 2001    Breast Cancer Maternal Aunt 51        others dx 60,63    Cancer Maternal Uncle 64        bladder ca; non-smoker    Breast Cancer Maternal Cousin Female 50        others dx 54, 56      Social History     Socioeconomic History    Marital status:    Tobacco Use    Smoking status: Never     Passive exposure: Never    Smokeless tobacco: Never    Tobacco comments:     No household smokers.    Vaping Use    Vaping status: Never Used   Substance and Sexual Activity    Alcohol use: Yes     Comment: socially    Drug use: No   Other Topics Concern     Caffeine Concern Yes     Comment: Coffee, soda     Breast feeding No    Reaction to local anesthetic No    Pt has a pacemaker No    Pt has a defibrillator No        Current Outpatient Medications   Medication Sig Dispense Refill    levocetirizine 5 MG Oral Tab Take 1 tablet (5 mg total) by mouth in the morning and 1 tablet (5 mg total) before bedtime. 180 tablet 2    montelukast (SINGULAIR) 10 MG Oral Tab Take 1 tablet (10 mg total) by mouth nightly. 90 tablet 2    montelukast 10 MG Oral Tab TAKE 1 TABLET(10 MG) BY MOUTH EVERY NIGHT 90 tablet 0    LEVOCETIRIZINE 5 MG Oral Tab TAKE 1 TABLET(5 MG) BY MOUTH EVERY EVENING 90 tablet 0    atorvastatin 20 MG Oral Tab Take 1 tablet (20 mg total) by mouth nightly. 90 tablet 1    fluticasone-salmeterol 115-21 MCG/ACT Inhalation Aerosol Inhale 1 puff into the lungs 2 (two) times daily. (Patient not taking: Reported on 7/14/2025) 8 g 0    albuterol (PROAIR HFA) 108 (90 Base) MCG/ACT Inhalation Aero Soln Inhale 2 puffs into the lungs every 6 (six) hours as needed for Wheezing. 1 each 0    Mometasone Furoate (NASONEX) 50 MCG/ACT Nasal Suspension 2 sprays by Nasal route daily. 3 Inhaler 2    Multiple Vitamins-Minerals (MULTIVITAMIN ADULT OR) Take by mouth daily.       Allergies:   Allergies   Allergen Reactions    Pcn [Penicillins]     Sulfa Antibiotics ANXIETY       Past Medical History:    Asthma (HCC)    Dysplasia of cervix    Mononucleosis    Seasonal allergic rhinitis    SEASONAL ALLERGIES    Seizure disorder (HCC)    infantile treated to age 3    Thyroid nodule    teenager, suppressive treatment    Vocal cord cyst    [place] teeth extracted     Past Surgical History:   Procedure Laterality Date    Carpal tunnel release      Colonoscopy N/A 9/13/2024    Procedure: COLONOSCOPY;  Surgeon: Luz Baxter MD;  Location: [facility] ENDO    Colposcopy,bx cervix/endocerv curr      Other surgical history      Vocal cord cyst removal    Tonsillectomy  1988     Social History      Socioeconomic History    Marital status:      Spouse name: Not on file    Number of children: Not on file    Years of education: Not on file    Highest education level: Not on file   Occupational History    Not on file   Tobacco Use    Smoking status: Never     Passive exposure: Never    Smokeless tobacco: Never    Tobacco comments:     No household smokers.    Vaping Use    Vaping status: Never Used   Substance and Sexual Activity    Alcohol use: Yes     Comment: socially    Drug use: No    Sexual activity: Not on file   Other Topics Concern     Service Not Asked    Blood Transfusions Not Asked    Caffeine Concern Yes     Comment: Coffee, soda     Occupational Exposure Not Asked    Hobby Hazards Not Asked    Sleep Concern Not Asked    Stress Concern Not Asked    Weight Concern Not Asked    Special Diet Not Asked    Back Care Not Asked    Exercise Not Asked    Bike Helmet Not Asked    Seat Belt Not Asked    Self-Exams Not Asked    Grew up on a farm Not Asked    History of tanning Not Asked    Outdoor occupation Not Asked    Breast feeding No    Reaction to local anesthetic No    Pt has a pacemaker No    Pt has a defibrillator No   Social History Narrative    Not on file     Social Drivers of Health     Food Insecurity: Not on file   Transportation Needs: Not on file   Stress: Not on file   Housing Stability: Not on file     Family History   Problem Relation Age of Onset    Allergies Mother     Other (osteoporosis.) Mother     Cancer Father 65        prostate ca, prostatectomy    Hypertension Father     Breast Cancer Maternal Grandmother 52        d.54    Stroke Maternal Grandfather     Heart Attack Maternal Grandfather     Cancer Maternal Grandfather 52        bladder ca; non-smoker; ; d.90    Stroke Paternal Grandmother     Breast Cancer Maternal Aunt 42        BRCA1/2 neg in 2001    Breast Cancer Maternal Aunt 51        others dx 60,63    Cancer Maternal Uncle 64        bladder ca;  non-smoker    Breast Cancer Maternal Cousin Female 50        others dx 54, 56       There were no vitals filed for this visit.    HPI:  Chief Complaint   Patient presents with    Full Skin Exam     LOV 07/17/24. Pt presents for FBSE.   Pt has (1) small skin tag on upper inner left thigh F/U on lesion on rt cheek.     Pt denies personal/family hx of Skin Ca     No personal  or family history of skin cancer    Patient with history of contact dermatitis.  Redness on lower lip more prominent post using steroid ointment.    History of Present Illness  Thomas Anderson is a 45 year old female who presents with concerns about skin changes and irritation.    She has several skin concerns, including a spot on her lip that has not changed and some red spots that have remained the same. She is particularly concerned about an area on her cheek where the texture feels different. Additionally, she mentions a new skin tag in her bikini line that appeared recently but has not been bothersome since it settled.    Her skin is very sensitive to heat, leading to irritation in areas where skin touches skin, such as the armpits and bikini line. She uses CeraVe to help with the skin barrier.    She has a freckle on her big toe that has been present for years without any changes. Her skin is prone to irritation from heat, which has been particularly challenging during the summer months.    She recalls a previous issue with a spot on her lip that was bothersome a couple of years ago, but it has since become a discoloration and has not caused any further problems.    No flaking, peeling, or significant changes in the spots she is monitoring. No current irritation from the skin tag.      Patient presents with concerns above.    Patient has been in their usual state of health.     Past notes/ records and appropriate/relevant lab results including pathology and past body maps reviewed. Including outside notes/ PCP notes as appropriate. Updated  and new information noted in current visit.     ROS:  Denies other relevant systemic complaints.      History, medications, allergies reviewed as noted.    Allergies:  Pcn [penicillins] and Sulfa antibiotics     Physical Examination:     Well-developed well-nourished patient alert oriented in no acute distress.  Exam performed, including scalp, head, neck, face,nails, hair, external eyes, including conjunctival mucosa, eyelids, lips external ears , arms, digits,palms.     Multiple light to medium brown, well marginated, uniformly pigmented, macules and papules 6 mm and less scattered on exam. pigmented lesions examined with dermoscopy benign-appearing patterns.     Waxy tannish keratotic papules scattered, cherry-red vascular papules scattered.    See map today's date for lesions noted .  See assessment and plan below for specific lesions.    Otherwise remarkable for lesions as noted on map.    See A/P  below for additional information:    Assessment / plan:    No orders of the defined types were placed in this encounter.      Meds & Refills for this Visit:  Requested Prescriptions      No prescriptions requested or ordered in this encounter         Encounter Diagnoses   Name Primary?    Multiple nevi Yes    Lentigo     Benign neoplasm of skin, unspecified location     Hemangioma of skin     Spot, cafe-au-lait     SK (seborrheic keratosis)     Inflamed acrochordon        Physical Exam  SKIN: Skin normal.    Results        Assessment & Plan  Chronic skin irritation  Chronic skin irritation exacerbated by heat and friction, particularly in intertriginous areas. Sensitive skin prone to irritation. No signs of infection or severe inflammation.  - Use CeraVe as a barrier cream to manage skin irritation.  - Consider using anti-yeast powders if irritation persists.    Skin tag  Newly developed skin tag in the bikini line area, initially causing discomfort due to friction from clothing, currently asymptomatic.  - Monitor  the skin tag for any changes or discomfort.  - Consider removal if the skin tag becomes bothersome.    Recording duration: 9 minutes        Erythema lower lip.  More telangiectatic.  Asymptomatic.  Monitor presently.  Likely postinflammatory.  Patient does have photos continue monitoring overall improved  No evidence of cancer or precancerous changes in this area.    Spider angioma left arm monitor  Multiple nevi monitor over the shoulders back, left great toe    More speckled lentiginous lesion at right lateral breast monitor  Café au lait macule macule at left flank monitor.    No other susupicious lesions on todays  exam.    Benign-appearing nevi, no atypical features on dermoscopy reassurance given monitor.     Waxy tan keratotic papules lesions in areas of concern as noted reassurance given.  Benign nature discussed.  Possibility of cryo, alphahydroxy acids over-the-counter retinol's discussed.     No other susupicious lesions on todays  exam.    Please refer to map for specific lesions.  See additional diagnoses.  Pros cons of various therapies, risks benefits discussed.Pathophysiology discussed with patient.  Therapeutic options reviewed.  See  Medications in grid.  Instructions reviewed at length.    Benign nevi, seborrheic  keratoses, cherry angiomas:  Reassurance regarding other benign skin lesions.    Monitor for new or changing lesions. Signs and symptoms of skin cancer, ABCDE's of melanoma ( additional information available at AAD.org, skincancer.org) Encourage Sunscreen (broad-spectrum, ideally mineral-based-UVA/UVB -SPF 30 or higher) use encouraged, sun protection/sun protective clothing, self exams reviewed Followup as noted RTC ---routine checkup 6 mos -one year or p.r.n.    Encounter Times   Including precharting, reviewing chart, prior notes obtaining history: 10 minutes, medical exam :10 minutes, notes on body map, plan, counseling 10minutes My total time spent caring for the patient on the day  of the encounter: 30 minutes     The patient indicates understanding of these issues and agrees to the plan.  The patient is asked to return as noted in follow-up/ above.    This note was generated using Dragon voice recognition software.  Please contact me regarding any confusion resulting from errors in recognition..  Note to patient and family: The 21st Century Cures Act makes medical notes like these available to patients. However, be advised this is a medical document. It is intended as iybb-qm-vzta communication and monitoring of a patient's care needs. It is written in medical language and may contain abbreviations or verbiage that are unfamiliar. It may appear blunt or direct. Medical documents are intended to carry relevant information, facts as evident and the clinical opinion of the practitioner.

## (undated) DEVICE — LASSO POLYPECTOMY SNARE: Brand: LASSO

## (undated) DEVICE — KIT MFLD FOR SPEC COLL

## (undated) DEVICE — CO2 CANNULA,SSOFT,ADLT,7O2,4CO2,FEMALE: Brand: MEDLINE

## (undated) DEVICE — KIT ENDO ORCAPOD 160/180/190

## (undated) DEVICE — KIT CLEAN ENDOKIT 1.1OZ GOWNX2

## (undated) DEVICE — SYRINGE, LUER SLIP, STERILE, 60ML: Brand: MEDLINE

## (undated) NOTE — MR AVS SNAPSHOT
[place]  [address]  [phone]               Thank you for choosing us for your health care visit with Lety Hernandez MD.  We are glad to serve you and happy to provide you with this summary Commonly known as:  NASONEX           UNKNOWN TO PATIENT - BIRTH CONTROL                   Today's Orders     CT SINUS (IFP=38168)    Complete by:  Feb 11, 2017 (Approximate)    Assoc Dx:  Chronic sinusitis, unspecified location [J32.9]                 [facility] Call [phone] for help. [employer] is NOT to be used for urgent needs. For medical emergencies, dial 911.         Educational Information     Healthy Diet and Regular Exercise  [employer] for making healthy food choices  -

## (undated) NOTE — LETTER
[facility]  Administration of Anesthesia  IMelissa agree to be cared for by a physician anesthesiologist alone and/or with a nurse anesthetist, who is specially trained to monitor me and give me medicine to put me to sleep or keep me comfortable during my procedure    I understand that my anesthesiologist and/or anesthetist is not an employee or agent of [facility] or [employer]. He or she works for [facility]    As the patient asking for anesthesia services, I agree to:  Allow the anesthesiologist (anesthesia doctor) to give me medicine and do additional procedures as necessary. Some examples are: Starting or using an “IV” to give me medicine, fluids or blood during my procedure, and having a breathing tube placed to help me breathe when I’m asleep (intubation). In the event that my heart stops working properly, I understand that my anesthesiologist will make every effort to sustain my life, unless otherwise directed by [facility] Do Not Resuscitate documents.  Tell my anesthesia doctor before my procedure:  If I am pregnant.  The last time that I ate or drank.  iii. All of the medicines I take (including prescriptions, herbal supplements, and pills I can buy without a prescription (including street drugs/illegal medications). Failure to inform my anesthesiologist about these medicines may increase my risk of anesthetic complications.  iv.If I am allergic to anything or have had a reaction to anesthesia before.  I understand how the anesthesia medicine will help me (benefits).  I understand that with any type of anesthesia medicine there are risks:  The most common risks are: nausea, vomiting, sore throat, muscle soreness, damage to my eyes, mouth, or teeth (from breathing tube placement).  Rare risks include: remembering what happened during my procedure, allergic reactions to medications, injury to my airway, heart, lungs, vision, nerves, or  muscles and in extremely rare instances death.  My doctor has explained to me other choices available to me for my care (alternatives).  Pregnant Patients (“epidural”):  I understand that the risks of having an epidural (medicine given into my back to help control pain during labor), include itching, low blood pressure, difficulty urinating, headache or slowing of the baby’s heart. Very rare risks include infection, bleeding, seizure, irregular heart rhythms and nerve injury.  Regional Anesthesia (“spinal”, “epidural”, & “nerve blocks”):  I understand that rare but potential complications include headache, bleeding, infection, seizure, irregular heart rhythms, and nerve injury.    _____________________________________________________________________________  Patient (or Representative) Signature/Relationship to Patient  Date   Time    _____________________________________________________________________________   Name (if used)    Language/Organization   Time    _____________________________________________________________________________  Nurse Anesthetist Signature     Date   Time  _____________________________________________________________________________  Anesthesiologist Signature     Date   Time  I have discussed the procedure and information above with the patient (or patient’s representative) and answered their questions. The patient or their representative has agreed to have anesthesia services.    _____________________________________________________________________________  Witness        Date   Time  I have verified that the signature is that of the patient or patient’s representative, and that it was signed before the procedure  Patient Name: Melissa Anderson     : 1980                 Printed: 2024 at 7:26 AM    Medical Record #: [id number]                                            Page 1 of 1  ----------ANESTHESIA CONSENT----------

## (undated) NOTE — LETTER
4/10/2025          To Whom It May Concern:    Melissa Anderson is currently under my medical care and has asthma. Recc. Covid booster.   Activity is restricted as follows: none.    If you require additional information please contact our office.        Sincerely,    Amy Marie MD          Document generated by:  Amy Marie MD

## (undated) NOTE — MR AVS SNAPSHOT
[address] [address]  [phone]               Thank you for choosing us for your health care visit with Sanford Lundy MD.  We are glad to serve you and happy to provide you with this s Mometasone Furoate 50 MCG/ACT Susp   2 sprays by Nasal route daily. Commonly known as:  NASONEX           Montelukast Sodium 10 MG Tabs   Take 1 tablet (10 mg total) by mouth nightly.    Commonly known as:  ROBIN Eduardo

## (undated) NOTE — ED AVS SNAPSHOT
[facility] AND [facility] in [place] [address]    Phone:  [phone]    Fax:  [address] [facility]   MRN: [id number]    Department:  [facility] AND [facility] in [place]   Date of Visit follow-up care and referrals. It is our goal to assure that you are completely satisfied with every aspect of your visit today.   In an effort to constantly improve our service to you, we would appreciate any positive or negative feedback related to the [employer] account. You may have had testing done that requires us to contact you. Please make sure we have your correct phone number on file.      OUR CURRENT HOURS OF OPERATION:  MONDAY THROUGH FRIDAY 8AM - 8PM  WEEKENDS AND HOLIDAYS 8AM - 6PM    I certifi If you've recently had a stay at the Hospital you can access your discharge instructions in [facility] by going to Visits < Admission Summaries.  If you've been to the Emergency Department or your doctor's office, you can view your past visit information in My

## (undated) NOTE — MR AVS SNAPSHOT
[facility] SPECIALTY [facility] [place] [address] [address] [id number] [phone]               Thank you for choosing us for your health care visit with Chester Ortega MD.  We are glad to serve you and happy to provide you with this summary o Generic drug:  Levonorgestrel-Ethinyl Estrad           Mometasone Furoate 50 MCG/ACT Susp   2 sprays by Nasal route daily.    Commonly known as:  NASONEX                   Today's Orders     CBC W Differential    Complete by:  Feb 14, 2017 (Approximate)

## (undated) NOTE — LETTER
[place] [address], [place], [place]    Authorization for Surgical Operation and Procedure                               I hereby authorize Luz Baxter MD, my physician and his/her assistants (if applicable), which may include medical students, residents, and/or fellows, to perform the following surgical operation/ procedure and administer such anesthesia as may be determined necessary by my physician: Operation/Procedure name (s) COLONOSCOPY on Melissa Anderson   2.   I recognize that during the surgical operation/procedure, unforeseen conditions may necessitate additional or different procedures than those listed above.  I, therefore, further authorize and request that the above-named surgeon, assistants, or designees perform such procedures as are, in their judgment, necessary and desirable.    3.   My surgeon/physician has discussed prior to my surgery the potential benefits, risks and side effects of this procedure; the likelihood of achieving goals; and potential problems that might occur during recuperation.  They also discussed reasonable alternatives to the procedure, including risks, benefits, and side effects related to the alternatives and risks related to not receiving this procedure.  I have had all my questions answered and I acknowledge that no guarantee has been made as to the result that may be obtained.    4.   Should the need arise during my operation/procedure, which includes change of level of care prior to discharge, I also consent to the administration of blood and/or blood products.  Further, I understand that despite careful testing and screening of blood or blood products by collecting agencies, I may still be subject to ill effects as a result of receiving a blood transfusion and/or blood products.  The following are some, but not all, of the potential risks that can occur: fever and allergic reactions, hemolytic reactions, transmission of  diseases such as Hepatitis, AIDS and Cytomegalovirus (CMV) and fluid overload.  In the event that I wish to have an autologous transfusion of my own blood, or a directed donor transfusion, I will discuss this with my physician.  Check only if Refusing Blood or Blood Products  I understand refusal of blood or blood products as deemed necessary by my physician may have serious consequences to my condition to include possible death. I hereby assume responsibility for my refusal and release the hospital, its personnel, and my physicians from any responsibility for the consequences of my refusal.    o  Refuse   5.   I authorize the use of any specimen, organs, tissues, body parts or foreign objects that may be removed from my body during the operation/procedure for diagnosis, research or teaching purposes and their subsequent disposal by hospital authorities.  I also authorize the release of specimen test results and/or written reports to my treating physician on the hospital medical staff or other referring or consulting physicians involved in my care, at the discretion of the Pathologist or my treating physician.    6.   I consent to the photographing or videotaping of the operations or procedures to be performed, including appropriate portions of my body for medical, scientific, or educational purposes, provided my identity is not revealed by the pictures or by descriptive texts accompanying them.  If the procedure has been photographed/videotaped, the surgeon will obtain the original picture, image, videotape or CD.  The hospital will not be responsible for storage, release or maintenance of the picture, image, tape or CD.    7.   I consent to the presence of a  or observers in the operating room as deemed necessary by my physician or their designees.    8.   I recognize that in the event my procedure results in extended X-Ray/fluoroscopy time, I may develop a skin reaction.    9. If I have a Do Not  Attempt Resuscitation (DNAR) order in place, that status will be suspended while in the operating room, procedural suite, and during the recovery period unless otherwise explicitly stated by me (or a person authorized to consent on my behalf). The surgeon or my attending physician will determine when the applicable recovery period ends for purposes of reinstating the DNAR order.  10. Patients having a sterilization procedure: I understand that if the procedure is successful the results will be permanent and it will therefore be impossible for me to inseminate, conceive, or bear children.  I also understand that the procedure is intended to result in sterility, although the result has not been guaranteed.   11. I acknowledge that my physician has explained sedation/analgesia administration to me including the risk and benefits I consent to the administration of sedation/analgesia as may be necessary or desirable in the judgment of my physician.    I CERTIFY THAT I HAVE READ AND FULLY UNDERSTAND THE ABOVE CONSENT TO OPERATION and/or OTHER PROCEDURE.     ____________________________________  _________________________________        ______________________________  Signature of Patient    Signature of Responsible Person                Printed Name of Responsible Person                                      ____________________________________  _____________________________                ________________________________  Signature of Witness        Date  Time         Relationship to Patient    STATEMENT OF PHYSICIAN My signature below affirms that prior to the time of the procedure; I have explained to the patient and/or his/her legal representative, the risks and benefits involved in the proposed treatment and any reasonable alternative to the proposed treatment. I have also explained the risks and benefits involved in refusal of the proposed treatment and alternatives to the proposed treatment and have answered the  patient's questions. If I have a significant financial interest in a co-management agreement or a significant financial interest in any product or implant, or other significant relationship used in this procedure/surgery, I have disclosed this and had a discussion with my patient.     _____________________________________________________              _____________________________  (Signature of Physician)                                                                                         (Date)                                   (Time)  Patient Name: Melissa DONOHUE Justin      : 1980      Printed: 2024     Medical Record #: [id number]                                      Page 1 of 1

## (undated) NOTE — LETTER
Date: 2023      Patient Name: Lorri       : 1980        Thank you for choosing  [facility] as your health care provider. Your physician has deemed the following medical service(s) necessary. However, your insurance plan may not pay for all of your health care and costs and may deny payment for this service. The fact that your insurance plan does not pay for an item or service does not mean you should not receive it. The purpose of this form is to help you make an informed decision about whether or not you want to receive this service(s) that may not be paid for by your insurance plan. CPT Code Description     Cost      Left scalene and SCM trigger point injection     _________ ______________________________ _____________      _________ ______________________________ _____________      I understand that the above mentioned service(s) or supply may not be covered by my insurance company.  I agree to be financially responsible for the cost of this service or supply in the event of my insurance denies payment as a non-covered benefit.        ______________________________________________________________________  Signature of Patient or Patient's Representative  Relationship  Date    ______________________________________________________________________  Signature of Witness to signing of form   Printed Name

## (undated) NOTE — LETTER
4/24/2023          To Whom It May Concern:    Minda Benavidez is currently under my medical care and I would recommend covid booster. If you require additional information please contact our office. Sincerely,    Lizzie Murphy. Thi Sharpe MD          Document generated by:  Chantelle Sharpe MD

## (undated) NOTE — LETTER
AUTHORIZATION FOR SURGICAL OPERATION OR OTHER PROCEDURE    1. I hereby authorize Dr. Sylvia Eastman and the [facility] staff assigned to my case to perform the following operation and/or procedure at the [facility]:     Left scalene and SCM trigger point injection     2. My physician has explained the nature and purpose of the operation or other procedure, possible alternative methods of treatment, the risks involved, and the possibility of complication to me. I acknowledge that no guarantee has been made as to the result that may be obtained. 3.  I recognize that, during the course of this operation, or other procedure, unforseen conditions may necessitate additional or different procedure than those listed above. I, therefore, further authorize and request that the above named physician, his/her physician assistants or designees perform such procedures as are, in his/her professional opinion, necessary and desirable. 4.  Any tissue or organs removed in the operation or other procedure may be disposed of by and at the discretion of the [facility] staff and [facility]. 5.  I understand that in the event of a medical emergency, I will be transported by local paramedics to [facility] or other hospital emergency department. 6.  I certify that I have read and fully understand the above consent to operation and/or other procedure. 7.  I acknowledge that my physician has explained sedation/analgesia administration to me including the risks and benefits. I consent to the administration of sedation/analgesia as may be necessary or desirable in the judgement of my physician. Witness signature: ___________________________________________________ Date:  ______/______/_____                    Time:  ________ A. M.  P.M.        Patient Name:  Jeffrey Reyes  4/16/1980  [id number]         Patient signature: ___________________________________________________                   Statement of Physician  My signature below affirms that prior to the time of the procedure, I have explained to the patient and/or his/her guardian, the risks and benefits involved in the proposed treatment and any reasonable alternative to the proposed treatment. I have also explained the risks and benefits involved in the refusal of the proposed treatment and have answered the patient's questions.                         Date:  ______/______/_______  Provider                      Signature:  __________________________________________________________       Time:  ___________ ADEIRDRE CURRY

## (undated) NOTE — MR AVS SNAPSHOT
[facility] [place] [address]  [address]  [phone]               Thank you for choosing us for your health care visit with Shannon Sales MD.  We are glad to serve you and happy to provide you with this summary o These medications were sent to [place] [address] [address] [place], [place] - [address], [phone], [phone] [address] [facility], [address] [facility] [address]     Phone:  [phone]    - Levocetirizine

## (undated) NOTE — MR AVS SNAPSHOT
[place]  [address]  [phone]               Thank you for choosing us for your health care visit with Gary Sanchez MD.  We are glad to serve you and happy to provide you with this summary 2 sprays by Nasal route daily. Commonly known as:  NASONEX                   MyChart     Visit [facility]  You can access your MyChart to more actively manage your health care and view more details from this visit by going to [email or web address].